# Patient Record
Sex: FEMALE | Race: BLACK OR AFRICAN AMERICAN | NOT HISPANIC OR LATINO | Employment: FULL TIME | URBAN - METROPOLITAN AREA
[De-identification: names, ages, dates, MRNs, and addresses within clinical notes are randomized per-mention and may not be internally consistent; named-entity substitution may affect disease eponyms.]

---

## 2017-01-16 ENCOUNTER — OFFICE VISIT (OUTPATIENT)
Dept: INTERNAL MEDICINE | Facility: CLINIC | Age: 37
End: 2017-01-16
Payer: COMMERCIAL

## 2017-01-16 VITALS
HEIGHT: 69 IN | DIASTOLIC BLOOD PRESSURE: 74 MMHG | BODY MASS INDEX: 28.17 KG/M2 | OXYGEN SATURATION: 98 % | HEART RATE: 70 BPM | TEMPERATURE: 98 F | WEIGHT: 190.2 LBS | SYSTOLIC BLOOD PRESSURE: 118 MMHG

## 2017-01-16 DIAGNOSIS — R79.0 LOW IRON STORES: ICD-10-CM

## 2017-01-16 DIAGNOSIS — E53.8 FOLATE DEFICIENCY: ICD-10-CM

## 2017-01-16 DIAGNOSIS — R53.82 CHRONIC FATIGUE: Primary | ICD-10-CM

## 2017-01-16 DIAGNOSIS — E54 SCURVY: ICD-10-CM

## 2017-01-16 DIAGNOSIS — E55.9 VITAMIN D DEFICIENCY: ICD-10-CM

## 2017-01-16 DIAGNOSIS — E53.1 VITAMIN B6 DEFICIENCY: ICD-10-CM

## 2017-01-16 DIAGNOSIS — G62.9 NEUROPATHY: ICD-10-CM

## 2017-01-16 DIAGNOSIS — E53.8 VITAMIN B12 DEFICIENCY (NON ANEMIC): ICD-10-CM

## 2017-01-16 PROCEDURE — 99214 OFFICE O/P EST MOD 30 MIN: CPT | Performed by: INTERNAL MEDICINE

## 2017-01-16 RX ORDER — PYRIDOXINE HCL (VITAMIN B6) 25 MG
25 TABLET ORAL DAILY
Qty: 30 TABLET | Refills: 8 | Status: SHIPPED | OUTPATIENT
Start: 2017-01-16 | End: 2017-07-17

## 2017-01-16 RX ORDER — PYRIDOXINE HCL (VITAMIN B6) 25 MG
25 TABLET ORAL DAILY
Qty: 30 TABLET | Refills: 8 | Status: SHIPPED | OUTPATIENT
Start: 2017-01-16 | End: 2017-01-16

## 2017-01-16 RX ORDER — FOLIC ACID 1 MG/1
1 TABLET ORAL DAILY
Qty: 100 TABLET | Refills: 3 | Status: SHIPPED | OUTPATIENT
Start: 2017-01-16 | End: 2017-07-17

## 2017-01-16 RX ORDER — FOLIC ACID 1 MG/1
1 TABLET ORAL DAILY
Qty: 100 TABLET | Refills: 3 | Status: SHIPPED | OUTPATIENT
Start: 2017-01-16 | End: 2017-01-16

## 2017-01-16 NOTE — NURSING NOTE
"Chief Complaint   Patient presents with     Results     f/up on lab results        Initial /74 mmHg  Pulse 70  Temp(Src) 98  F (36.7  C) (Oral)  Ht 5' 9\" (1.753 m)  Wt 190 lb 3.2 oz (86.274 kg)  BMI 28.07 kg/m2  SpO2 98%  LMP 12/16/2016 Estimated body mass index is 28.07 kg/(m^2) as calculated from the following:    Height as of this encounter: 5' 9\" (1.753 m).    Weight as of this encounter: 190 lb 3.2 oz (86.274 kg).  BP completed using cuff size: regular    "

## 2017-01-16 NOTE — PATIENT INSTRUCTIONS
** FOLLOW UP PLAN**:    PLEASE SCHEDULE LABS WITH OFFICE VISIT 6 MONTHS FROM TODAY TO FOLLOW UP ON    Chronic fatigue  Neuropathy (H)  Vitamin Deficiencies  Low iron stores      BE SURE TO SCHEDULE YOUR LAB DRAW APPOINTMENT FOR AT LEAST 1 WEEK BEFORE YOUR NEXT VISIT      YOU MAY CONTACT THE CLINIC IF ANY QUESTIONS OR CONCERNS -[phone] OR VIA [employer]

## 2017-01-16 NOTE — MR AVS SNAPSHOT
After Visit Summary   1/16/2017    Kalen Tracy    MRN: [id number]           Patient Information     Date Of Birth          1980        Visit Information        Provider Department      1/16/2017 10:45 AM Michael Rebolledo MD; [employer] [facility]        Today's Diagnoses     Chronic fatigue    -  1     Neuropathy (H)         Vitamin D deficiency         Vitamin B12 deficiency (non anemic)         Folate deficiency         Vitamin B6 deficiency         Low iron stores         Scurvy           Care Instructions    ** FOLLOW UP PLAN**:    PLEASE SCHEDULE LABS WITH OFFICE VISIT 6 MONTHS FROM TODAY TO FOLLOW UP ON    Chronic fatigue  Neuropathy (H)  Vitamin Deficiencies  Low iron stores      BE SURE TO SCHEDULE YOUR LAB DRAW APPOINTMENT FOR AT LEAST 1 WEEK BEFORE YOUR NEXT VISIT      YOU MAY CONTACT THE CLINIC IF ANY QUESTIONS OR CONCERNS -[phone] OR VIA [employer]                   Follow-ups after your visit        Future tests that were ordered for you today     Open Future Orders        Priority Expected Expires Ordered    Vitamin D Deficiency Routine 1/16/2017 7/16/2017 1/16/2017    Ferritin Routine 1/16/2017 7/16/2017 1/16/2017    Vitamin C Routine 1/16/2017 7/16/2017 1/16/2017    Vitamin B12 Routine 1/16/2017 7/16/2017 1/16/2017    Folate Routine 1/16/2017 3/17/2017 1/16/2017    Vitamin B6 Routine 1/16/2017 7/16/2017 1/16/2017            Who to contact     If you have questions or need follow up information about today's clinic visit or your schedule please contact [facility] directly at [phone].  Normal or non-critical lab and imaging results will be communicated to you by MyChart, letter or phone within 4 business days after the clinic has received the results. If you do not hear from us within 7 days, please contact the clinic through [employer] or phone. If you have a critical or abnormal lab result, we will  "notify you by phone as soon as possible.  Submit refill requests through [employer] or call your pharmacy and they will forward the refill request to us. Please allow 3 business days for your refill to be completed.          Additional Information About Your Visit        [employer] Information     SocialGuide lets you send messages to your doctor, view your test results, renew your prescriptions, schedule appointments and more. To sign up, go to [email or web address] . Click on \"Log in\" on the left side of the screen, which will take you to the Welcome page. Then click on \"Sign up Now\" on the right side of the page.     You will be asked to enter the access code listed below, as well as some personal information. Please follow the directions to create your username and password.     Your access code is: [email or web address]  Expires: 2017 12:13 PM     Your access code will  in 90 days. If you need help or a new code, please call your [facility] clinic or [phone].        Care EveryWhere ID     This is your Care EveryWhere ID. This could be used by other organizations to access your [facility] medical records  [id number]        Your Vitals Were     Pulse Temperature Height BMI (Body Mass Index) Pulse Oximetry Last Period    70 98  F (36.7  C) (Oral) 5' 9\" (1.753 m) 28.07 kg/m2 98% 2016       Blood Pressure from Last 3 Encounters:   17 118/74   16 110/62   06/11/15 116/80    Weight from Last 3 Encounters:   17 190 lb 3.2 oz (86.274 kg)   16 190 lb (86.183 kg)   06/11/15 198 lb 8 oz (90.039 kg)                 Today's Medication Changes          These changes are accurate as of: 17 12:13 PM.  If you have any questions, ask your nurse or doctor.               Start taking these medicines.        Dose/Directions    Calcium Carb-Cholecalciferol 1000-800 MG-UNIT Tabs   Commonly known as:  CALCIUM 1000 + D   Used for:  Vitamin D deficiency   Started by:  Michael Rebolledo MD        Dose:  1 " tablet   Take 1 tablet by mouth daily TAKE WITH FOOD, FOR BONE HEALTH AND LOW VITAMIN D ([facility])   Quantity:  100 tablet   Refills:  PRN       cholecalciferol 5000 UNITS Caps   Used for:  Vitamin D deficiency   Started by:  Michael Rebolledo MD        Dose:  1 capsule   Take 1 capsule (5,000 Units) by mouth daily FOR VITAMIN D DEFICIENCY (LOW VITAMIN D),TAKE 2 CAPSULES FOR THE NEXT 2 WEEKS, THEN 1 CAPSULE DAILY, MUST TAKE WITH WITH FAT CONTAINING MEAL   Quantity:  100 capsule   Refills:  3       Cyanocobalamin 5000 MCG/ML Liqd   Commonly known as:  B-12 SUPER STRENGTH   Used for:  Vitamin B12 deficiency (non anemic), Neuropathy (H)   Started by:  Michael Rebolledo MD        Dose:  1 mL   Place 1 mL under the tongue daily FOR VITAMIN B12 SUPPLEMENTATION, PLEASE PLACE UNDER THE TONGUE   Quantity:  2 Bottle   Refills:  PRN       ferrous sulfate Dried 140 (45 FE) MG Tbcr   Used for:  Low iron stores   Started by:  Michael Rebolledo MD        Dose:  1 tablet   Take 1 tablet by mouth daily IRON SUPPLEMENT - PLEASE TAKE WITH EMERGEN-C SUBSTITUTION OF IRON SUPPLEMENT PERMITTED   Quantity:  90 tablet   Refills:  3       folic acid 1 MG tablet   Commonly known as:  FOLVITE   Used for:  Neuropathy (H), Folate deficiency   Started by:  Michael Rebolledo MD        Dose:  1 mg   Take 1 tablet (1 mg) by mouth daily INDICATION: FOLIC ACID SUPPLEMENT   Quantity:  100 tablet   Refills:  3       pyridOXINE 25 MG tablet   Commonly known as:  vitamin B-6   Used for:  Vitamin B6 deficiency   Started by:  Michael Rebolledo MD        Dose:  25 mg   Take 1 tablet (25 mg) by mouth daily INDICATION: VITAMIN B6 DEFICIENCY TO TREAT BREAST PAIN   Quantity:  30 tablet   Refills:  8            Where to get your medicines      These medications were sent to [address] [address]     Phone:  [phone] - calcium  Carb-Cholecalciferol 1000-800 MG-UNIT Tabs  - cholecalciferol 5000 UNITS Caps  - Cyanocobalamin 5000 MCG/ML Liqd  - ferrous sulfate Dried 140 (45 FE) MG Tbcr  - folic acid 1 MG tablet  - pyridOXINE 25 MG tablet      These medications were sent to [id number] - [place], [place] - [address] [facility] [address] [place] [address]  [address] [place], [place] [address]    Hours:  24-hours Phone:  [phone]    - vitamin C-electrolytes 1000mg vitamin C super orange drink mix             Primary Care Provider Office Phone # Fax #    Michael Rebolledo [phone]       [facility] [address]  [facility] [address]        Thank you!     Thank you for choosing [facility]  for your care. Our goal is always to provide you with excellent care. Hearing back from our patients is one way we can continue to improve our services. Please take a few minutes to complete the written survey that you may receive in the mail after your visit with us. Thank you!             Your Updated Medication List - Protect others around you: Learn how to safely use, store and throw away your medicines at www.disposemymeds.org.          This list is accurate as of: 1/16/17 12:13 PM.  Always use your most recent med list.                   Brand Name Dispense Instructions for use    Calcium Carb-Cholecalciferol 1000-800 MG-UNIT Tabs    CALCIUM 1000 + D    100 tablet    Take 1 tablet by mouth daily TAKE WITH FOOD, FOR BONE HEALTH AND LOW VITAMIN D ([facility])       cetirizine 10 MG tablet    zyrTEC    90 tablet    Take 1 tablet (10 mg) by mouth daily TO TREAT NASAL ALLERGIES       cholecalciferol 5000 UNITS Caps     100 capsule    Take 1 capsule (5,000 Units) by mouth daily FOR VITAMIN D DEFICIENCY (LOW VITAMIN D),TAKE 2 CAPSULES FOR THE NEXT 2 WEEKS, THEN 1 CAPSULE DAILY, MUST TAKE WITH WITH FAT CONTAINING MEAL       Cyanocobalamin 5000 MCG/ML Liqd     B-12 SUPER STRENGTH    2 Bottle    Place 1 mL under the tongue daily FOR VITAMIN B12 SUPPLEMENTATION, PLEASE PLACE UNDER THE TONGUE       ferrous sulfate Dried 140 (45 FE) MG Tbcr     90 tablet    Take 1 tablet by mouth daily IRON SUPPLEMENT - PLEASE TAKE WITH EMERGEN-C SUBSTITUTION OF IRON SUPPLEMENT PERMITTED       fluticasone 50 MCG/ACT spray    FLONASE    16 g    Spray 2 sprays in nostril At Bedtime INDICATION: TO CONTROL NASAL ALLERGY SYMPTOMS, DO NOT BLOW NOSE FOR 30 MINUTES AFTER USING       folic acid 1 MG tablet    FOLVITE    100 tablet    Take 1 tablet (1 mg) by mouth daily INDICATION: FOLIC ACID SUPPLEMENT       MICROGESTIN FE 1.5/30 1.5-30 MG-MCG per tablet   Generic drug:  norethindrone-ethinyl estradiol-iron      Take 1 tablet by mouth       pyridOXINE 25 MG tablet    vitamin B-6    30 tablet    Take 1 tablet (25 mg) by mouth daily INDICATION: VITAMIN B6 DEFICIENCY TO TREAT BREAST PAIN       vitamin C-electrolytes 1000mg vitamin C super orange drink mix    EMERGEN-C    90 packet    Mix 1 packet in 4-6oz water and take once daily, INDICATION: VITAMIN C SUPPLEMENTION

## 2017-01-16 NOTE — PROGRESS NOTES
"  SUBJECTIVE:                                                    Kalen Tracy is a 36 year old female who presents to clinic today for the following health issues:  Chief Complaint   Patient presents with     Results     f/up on lab results       She has a history of Vitamin deficiencies causing fatigue and myalgias. She has been somewhat compliant but has stopped taking her all her meds except for B12, calcium and Vit D.    Kalen's most recent test results are as noted and addressed in the plan section of this note, and are significant for findings consistent with:   Vitamin D deficiency  (primary encounter diagnosis) - partially corrected.  Vitamin B12 deficiency (non anemic)  Folate deficiency  Vitamin B6 deficiency  Low iron stores  Scurvy    Which could certainly account for a lot of the symptoms that she has been experiencing.      Other significant issues as outlined and addressed in the plan section of this note.      Problem list and histories reviewed & adjusted, as indicated.  Additional history: as documented    Labs reviewed in EPIC  Problem list, Medication list, Allergies, and Medical/Social/Surgical histories reviewed in Psychiatric and updated as appropriate.    ROS:  14 point ROS negative except as above      OBJECTIVE:                                                    /74 mmHg  Pulse 70  Temp(Src) 98  F (36.7  C) (Oral)  Ht 5' 9\" (1.753 m)  Wt 190 lb 3.2 oz (86.274 kg)  BMI 28.07 kg/m2  SpO2 98%  LMP 12/16/2016  Body mass index is 28.07 kg/(m^2).  GENERAL: healthy, alert and no distress  EYES: Eyes grossly normal to inspection, PERRL and conjunctivae and sclerae normal  HENT: ear canals and TM's normal, nose and mouth without ulcers or lesions  NECK: no adenopathy, no asymmetry, masses, or scars and thyroid normal to palpation  RESP: lungs clear to auscultation - no rales, rhonchi or wheezes  CV: regular rate and rhythm, normal S1 S2, no S3 or S4, no murmur, click or rub, no peripheral " edema and peripheral pulses strong  ABDOMEN: soft, nontender, no hepatosplenomegaly, no masses and bowel sounds normal  MS: no gross musculoskeletal defects noted, no edema    Diagnostic Test Results:  Results for orders placed or performed in visit on 12/19/16   Iron and iron binding capacity   Result Value Ref Range    Iron 110 35 - 180 ug/dL    Iron Binding Cap 371 240 - 430 ug/dL    Iron Saturation Index 30 15 - 46 %   Vitamin B6   Result Value Ref Range    Vitamin B6 60.3    Vitamin C   Result Value Ref Range    Vitamin C 13 (L)    Vitamin B12   Result Value Ref Range    Vitamin B12 477 193 - 986 pg/mL   TSH   Result Value Ref Range    TSH 2.48 0.40 - 4.00 mU/L   T4 free   Result Value Ref Range    T4 Free 0.97 0.76 - 1.46 ng/dL   Folate   Result Value Ref Range    Folate 9.0 >5.4 ng/mL   Ferritin   Result Value Ref Range    Ferritin 57 12 - 150 ng/mL   Vitamin D Deficiency   Result Value Ref Range    Vitamin D Deficiency screening 49 20 - 75 ug/L        ASSESSMENT/PLAN:                                                      DIAGNOSIS/PLAN:   THE PROBLEMS THAT WERE ADDRESSED TODAY ARE AS FOLLOWS:    Encounter Diagnoses   Name Primary?     Neuropathy (H)      Vitamin D deficiency      Vitamin B12 deficiency (non anemic)      Folate deficiency      Vitamin B6 deficiency      Low iron stores      Scurvy      Chronic fatigue Yes       SIGNIFICANT ISSUES  AS NOTED AND ADDRESSED ABOVE     MEDS AND AND LABS AS ORDERED TO ADDRESS PREVIOUS AND CURRENT ABNORMAL INDICES      SEE PATIENT INSTRUCTION SECTION FOR FOLLOW UP PLAN    Kalen IS TO CONTINUE OTHER TREATMENT REGIMEN/PLANS EXCEPT AS INDICATED    COMPLIANCE WITH MEDICATIONS DIET AND EXERCISE PLANS ENCOURAGED    DISCONTINUED MEDS:  Medications Discontinued During This Encounter   Medication Reason     cholecalciferol 5000 UNITS CAPS Reorder     Calcium Carb-Cholecalciferol (CALCIUM 1000 + D) 1000-800 MG-UNIT TABS Reorder     cyabnocobalamin (VITAMIN B-12) 2500 MCG  sublingual tablet Reorder     pyridOXINE (VITAMIN  B-6) 25 MG tablet Reorder     vitamin C-electrolytes (EMERGEN-C) 1000mg vitamin C super orange drink mix Reorder     ferrous sulfate Dried 140 (45 FE) MG TBCR Reorder     Cyanocobalamin (B-12 SUPER STRENGTH) 5000 MCG/ML LIQD Reorder     pyridOXINE (VITAMIN  B-6) 25 MG tablet Reorder     folic acid (FOLVITE) 1 MG tablet Reorder     ferrous sulfate Dried 140 (45 FE) MG TBCR Reorder     cholecalciferol 5000 UNITS CAPS Reorder     Calcium Carb-Cholecalciferol (CALCIUM 1000 + D) 1000-800 MG-UNIT TABS Reorder       CURRENT MED LIST WITH CHANGES AS NOTED BELOW:  Current Outpatient Prescriptions   Medication Sig Dispense Refill     vitamin C-electrolytes (EMERGEN-C) 1000mg vitamin C super orange drink mix Mix 1 packet in 4-6oz water and take once daily, INDICATION: VITAMIN C SUPPLEMENTION 90 packet PRN     Cyanocobalamin (B-12 SUPER STRENGTH) 5000 MCG/ML LIQD Place 1 mL under the tongue daily FOR VITAMIN B12 SUPPLEMENTATION, PLEASE PLACE UNDER THE TONGUE 2 Bottle PRN     pyridOXINE (VITAMIN  B-6) 25 MG tablet Take 1 tablet (25 mg) by mouth daily INDICATION: VITAMIN B6 DEFICIENCY TO TREAT BREAST PAIN 30 tablet 8     folic acid (FOLVITE) 1 MG tablet Take 1 tablet (1 mg) by mouth daily INDICATION: FOLIC ACID SUPPLEMENT 100 tablet 3     ferrous sulfate Dried 140 (45 FE) MG TBCR Take 1 tablet by mouth daily IRON SUPPLEMENT - PLEASE TAKE WITH EMERGEN-C SUBSTITUTION OF IRON SUPPLEMENT PERMITTED 90 tablet 3     cholecalciferol 5000 UNITS CAPS Take 1 capsule (5,000 Units) by mouth daily FOR VITAMIN D DEFICIENCY (LOW VITAMIN D),TAKE 2 CAPSULES FOR THE NEXT 2 WEEKS, THEN 1 CAPSULE DAILY, MUST TAKE WITH WITH FAT CONTAINING MEAL 100 capsule 3     Calcium Carb-Cholecalciferol (CALCIUM 1000 + D) 1000-800 MG-UNIT TABS Take 1 tablet by mouth daily TAKE WITH FOOD, FOR BONE HEALTH AND LOW VITAMIN D ([facility]) 100 tablet PRN     fluticasone (FLONASE) 50 MCG/ACT nasal  spray Spray 2 sprays in nostril At Bedtime INDICATION: TO CONTROL NASAL ALLERGY SYMPTOMS, DO NOT BLOW NOSE FOR 30 MINUTES AFTER USING 16 g PRN     cetirizine (ZYRTEC) 10 MG tablet Take 1 tablet (10 mg) by mouth daily TO TREAT NASAL ALLERGIES 90 tablet 0     MICROGESTIN FE 1.5/30 1.5-30 MG-MCG tablet Take 1 tablet by mouth  3         OFFICE VISIT WAS ACCOMPLISHED WITH THE HELP OF [employer]       Patient Instructions   ** FOLLOW UP PLAN**:    PLEASE SCHEDULE LABS WITH OFFICE VISIT 6 MONTHS FROM TODAY TO FOLLOW UP ON    Chronic fatigue  Neuropathy (H)  Vitamin Deficiencies  Low iron stores      BE SURE TO SCHEDULE YOUR LAB DRAW APPOINTMENT FOR AT LEAST 1 WEEK BEFORE YOUR NEXT VISIT      YOU MAY CONTACT THE CLINIC IF ANY QUESTIONS OR CONCERNS -[phone] OR VIA [employer]                   Michael Rebolledo MD  [facility]

## 2017-07-10 DIAGNOSIS — E53.8 FOLATE DEFICIENCY: ICD-10-CM

## 2017-07-10 DIAGNOSIS — E53.8 VITAMIN B12 DEFICIENCY (NON ANEMIC): ICD-10-CM

## 2017-07-10 DIAGNOSIS — E55.9 VITAMIN D DEFICIENCY: ICD-10-CM

## 2017-07-10 DIAGNOSIS — E53.1 VITAMIN B6 DEFICIENCY: ICD-10-CM

## 2017-07-10 DIAGNOSIS — R79.0 LOW IRON STORES: ICD-10-CM

## 2017-07-10 DIAGNOSIS — E54 SCURVY: ICD-10-CM

## 2017-07-10 LAB
FOLATE SERPL-MCNC: 4.9 NG/ML
VIT B12 SERPL-MCNC: 478 PG/ML (ref 193–986)

## 2017-07-10 PROCEDURE — 99000 SPECIMEN HANDLING OFFICE-LAB: CPT | Performed by: INTERNAL MEDICINE

## 2017-07-10 PROCEDURE — 82607 VITAMIN B-12: CPT | Performed by: INTERNAL MEDICINE

## 2017-07-10 PROCEDURE — 82728 ASSAY OF FERRITIN: CPT | Performed by: INTERNAL MEDICINE

## 2017-07-10 PROCEDURE — 36415 COLL VENOUS BLD VENIPUNCTURE: CPT | Performed by: INTERNAL MEDICINE

## 2017-07-10 PROCEDURE — 84207 ASSAY OF VITAMIN B-6: CPT | Mod: 90 | Performed by: INTERNAL MEDICINE

## 2017-07-10 PROCEDURE — 82746 ASSAY OF FOLIC ACID SERUM: CPT | Performed by: INTERNAL MEDICINE

## 2017-07-10 PROCEDURE — 82306 VITAMIN D 25 HYDROXY: CPT | Performed by: INTERNAL MEDICINE

## 2017-07-10 PROCEDURE — 82180 ASSAY OF ASCORBIC ACID: CPT | Mod: 90 | Performed by: INTERNAL MEDICINE

## 2017-07-11 LAB
DEPRECATED CALCIDIOL+CALCIFEROL SERPL-MC: 56 UG/L (ref 20–75)
FERRITIN SERPL-MCNC: 65 NG/ML (ref 12–150)
VIT B6 SERPL-MCNC: 18.5 NG/ML

## 2017-07-14 LAB — VIT C SERPL-MCNC: 9 MG/DL

## 2017-07-17 ENCOUNTER — RESULTS ONLY (OUTPATIENT)
Dept: OTHER | Facility: CLINIC | Age: 37
End: 2017-07-17

## 2017-07-17 ENCOUNTER — OFFICE VISIT (OUTPATIENT)
Dept: INTERNAL MEDICINE | Facility: CLINIC | Age: 37
End: 2017-07-17
Payer: COMMERCIAL

## 2017-07-17 VITALS
RESPIRATION RATE: 18 BRPM | SYSTOLIC BLOOD PRESSURE: 100 MMHG | TEMPERATURE: 97.8 F | BODY MASS INDEX: 28.06 KG/M2 | HEART RATE: 72 BPM | OXYGEN SATURATION: 100 % | DIASTOLIC BLOOD PRESSURE: 70 MMHG | WEIGHT: 190 LBS

## 2017-07-17 DIAGNOSIS — R79.0 LOW IRON STORES: ICD-10-CM

## 2017-07-17 DIAGNOSIS — E53.1 VITAMIN B6 DEFICIENCY: ICD-10-CM

## 2017-07-17 DIAGNOSIS — E54 SCURVY: ICD-10-CM

## 2017-07-17 DIAGNOSIS — R53.82 CHRONIC FATIGUE: ICD-10-CM

## 2017-07-17 DIAGNOSIS — E55.9 VITAMIN D DEFICIENCY: ICD-10-CM

## 2017-07-17 DIAGNOSIS — D53.1 COMBINED B12 AND FOLATE DEFICIENCY ANEMIA: Primary | ICD-10-CM

## 2017-07-17 DIAGNOSIS — G62.9 NEUROPATHY: ICD-10-CM

## 2017-07-17 PROCEDURE — 83516 IMMUNOASSAY NONANTIBODY: CPT | Mod: 90 | Performed by: INTERNAL MEDICINE

## 2017-07-17 PROCEDURE — 81375 HLA II TYPING AG EQUIV LR: CPT | Performed by: INTERNAL MEDICINE

## 2017-07-17 PROCEDURE — 86256 FLUORESCENT ANTIBODY TITER: CPT | Mod: 90 | Performed by: INTERNAL MEDICINE

## 2017-07-17 PROCEDURE — 82784 ASSAY IGA/IGD/IGG/IGM EACH: CPT | Performed by: INTERNAL MEDICINE

## 2017-07-17 PROCEDURE — 99000 SPECIMEN HANDLING OFFICE-LAB: CPT | Performed by: INTERNAL MEDICINE

## 2017-07-17 PROCEDURE — 83516 IMMUNOASSAY NONANTIBODY: CPT | Mod: 91 | Performed by: INTERNAL MEDICINE

## 2017-07-17 PROCEDURE — 99215 OFFICE O/P EST HI 40 MIN: CPT | Performed by: INTERNAL MEDICINE

## 2017-07-17 PROCEDURE — 36415 COLL VENOUS BLD VENIPUNCTURE: CPT | Performed by: INTERNAL MEDICINE

## 2017-07-17 PROCEDURE — 81376 HLA II TYPING 1 LOCUS LR: CPT | Mod: 59 | Performed by: INTERNAL MEDICINE

## 2017-07-17 RX ORDER — MV-MIN/VIT C/GLUT/LYSINE/HB124 1000-50 MG
1 TABLET, EFFERVESCENT ORAL EVERY MORNING
Qty: 30 TABLET | Refills: 11 | Status: SHIPPED | OUTPATIENT
Start: 2017-07-17 | End: 2020-04-22

## 2017-07-17 RX ORDER — PYRIDOXINE HCL (VITAMIN B6) 50 MG
50 TABLET ORAL DAILY
Qty: 90 TABLET | Refills: 3 | Status: SHIPPED | OUTPATIENT
Start: 2017-07-17 | End: 2019-04-26

## 2017-07-17 RX ORDER — CYANOCOBALAMIN 1000 UG/ML
1 INJECTION, SOLUTION INTRAMUSCULAR; SUBCUTANEOUS
Qty: 30 ML | Refills: 5 | OUTPATIENT
Start: 2017-07-17 | End: 2019-04-26

## 2017-07-17 RX ORDER — CYANOCOBALAMIN 1000 UG/ML
2 INJECTION, SOLUTION INTRAMUSCULAR; SUBCUTANEOUS ONCE
Qty: 2 ML | Refills: 0 | OUTPATIENT
Start: 2017-07-17 | End: 2017-07-17

## 2017-07-17 NOTE — NURSING NOTE
"Chief Complaint   Patient presents with     Fatigue     Results       Initial /70  Pulse 72  Temp 97.8  F (36.6  C) (Oral)  Resp 18  Wt 190 lb (86.2 kg)  LMP 07/10/2017  SpO2 100%  BMI 28.06 kg/m2 Estimated body mass index is 28.06 kg/(m^2) as calculated from the following:    Height as of 1/16/17: 5' 9\" (1.753 m).    Weight as of this encounter: 190 lb (86.2 kg).  Blood pressure completed using cuff size: regular    "

## 2017-07-17 NOTE — PATIENT INSTRUCTIONS
** FOLLOW UP PLAN**:    PLEASE SCHEDULE LABS WITH OFFICE VISIT 3 MONTHS FROM TODAY TO FOLLOW UP ON  VITAMIN DEFICIENCIES AND SYMPTOMS OF FATIGUE     BE SURE TO SCHEDULE YOUR LAB DRAW APPOINTMENT FOR AT LEAST 5 DAYS BEFORE YOUR NEXT VISIT    PLEASE SCHEDULE NURSE ONLY APPOINTMENTS FOR B-12 SHOTS AT CLINIC CLOSEST TO PATIENT'S HOME PER THE SCHEDULE BELOW:  DAILY FOR 7 DAYS, THEN EVERY WEEK FOR A MONTH, THEN EVERY TWO WEEKS     WE WILL DISCUSS YOUR TEST RESULTS  FROM TODAY VIA MY CHART    YOU MAY CONTACT THE CLINIC IF ANY QUESTIONS OR CONCERNS -[phone] OR VIA [employer]

## 2017-07-17 NOTE — PROGRESS NOTES
SUBJECTIVE:                                                    Kalen Tracy is a 36 year old female who presents to clinic today for the following health issues:      Fatigue      Duration: many years    Description (location/character/radiation): general fatigue    Intensity:  mild    Accompanying signs and symptoms: none    History (similar episodes/previous evaluation): n/a    Precipitating or alleviating factors: vitamin deficiencies    Therapies tried and outcome: Medications as ordered. Kalne states that she has been taking her medications regularly except for vitamin C which gives her reflux, but her labs still show that she is deficient in vitamin B-12, B 6 and she also has a moderate to  Severe folate deficiency despite indicating that she takes her medications. Which would suggest that there may be an underlying malabsorptive process such as celiac disease for which she has not been screened in the past.     Kalen's most recent test results are as noted and addressed in the plan section of this note, and are significant for findings consistent with:   Combined B12 and folate deficiency  Vitamin B6 deficiency  Scurvy      Which could certainly account for a lot of the symptoms that she has been experiencing. She however states that she had noted improvement in symptoms of fatigue, andmuscle aches and pains.    Other significant issues as outlined and addressed in the plan section of this note.      Problem list and histories reviewed & adjusted, as indicated.  Additional history: as documented    Labs reviewed in EPIC and as noted above    Reviewed and updated as needed this visit by clinical staff  Tobacco  Allergies  Meds  Med Hx  Surg Hx  Fam Hx  Soc Hx      Reviewed and updated as needed this visit by Provider         ROS:  14 point ROS negative except as above    OBJECTIVE:     /70  Pulse 72  Temp 97.8  F (36.6  C) (Oral)  Resp 18  Wt 190 lb (86.2 kg)  LMP 07/10/2017  SpO2  100%  BMI 28.06 kg/m2  Body mass index is 28.06 kg/(m^2).  GENERAL: healthy, alert and no distress  NECK: no adenopathy, no asymmetry, masses, or scars and thyroid normal to palpation  RESP: lungs clear to auscultation - no rales, rhonchi or wheezes  CV: regular rate and rhythm, normal S1 S2, no S3 or S4, no murmur, click or rub, no peripheral edema and peripheral pulses strong  ABDOMEN: soft, nontender, no hepatosplenomegaly, no masses and bowel sounds normal  MS: no gross musculoskeletal defects noted, no edema  SKIN: no suspicious lesions or rashes  PSYCH: mentation appears normal, affect normal/bright    Diagnostic Test Results:  Results for orders placed or performed in visit on 07/10/17   Vitamin D Deficiency   Result Value Ref Range    Vitamin D Deficiency screening 56 20 - 75 ug/L   Ferritin   Result Value Ref Range    Ferritin 65 12 - 150 ng/mL   Vitamin C   Result Value Ref Range    Vitamin C 9 (L)    Vitamin B12   Result Value Ref Range    Vitamin B12 478 193 - 986 pg/mL   Vitamin B6   Result Value Ref Range    Vitamin B6 18.5 (L)    Folate   Result Value Ref Range    Folate 4.9 (L) >5.4 ng/mL       ASSESSMENT/PLAN:     DIAGNOSIS/PLAN:   THE PROBLEMS THAT WERE ADDRESSED TODAY ARE AS FOLLOWS:    Encounter Diagnoses   Name Primary?     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     Chronic fatigue      Scurvy      Low iron stores      Vitamin D deficiency        SIGNIFICANT ISSUES  AS NOTED AND ADDRESSED ABOVE     MEDS AND AND LABS AS ORDERED TO ADDRESS PREVIOUS AND CURRENT ABNORMAL INDICES    PLEASE SEE HISTORY ABOVE FOR FULL DETAILS OF THE PLANNED WORKUP AS I BELIEVE THAT PATIENT MAY BE DEALING WITH A MALABSORPTIVE PROCESS    SHE WILL HAVE LABS DRAWN TODAY TO EXCLUDE CELIAC DISEASE AND SHE WILL FOLLOW-UP WITH ME VIA IZI-collecteT      SEE PATIENT INSTRUCTION SECTION FOR FOLLOW UP PLAN    Kalen IS TO CONTINUE OTHER TREATMENT REGIMEN/PLANS EXCEPT AS  INDICATED    COMPLIANCE WITH MEDICATIONS DIET AND EXERCISE PLANS ENCOURAGED    DISCONTINUED MEDS:  Medications Discontinued During This Encounter   Medication Reason     Cyanocobalamin (B-12 SUPER STRENGTH) 5000 MCG/ML LIQD Reorder     pyridOXINE (VITAMIN  B-6) 25 MG tablet Reorder     ferrous sulfate Dried 140 (45 FE) MG TBCR Reorder     cholecalciferol 5000 UNITS CAPS Reorder       CURRENT MED LIST WITH CHANGES AS NOTED BELOW:  Current Outpatient Prescriptions   Medication Sig Dispense Refill     cyanocobalamin (VITAMIN B12) 1000 MCG/ML injection Inject 1 mL (1,000 mcg) into the muscle every 30 days DAILY FOR 7 DAYS, THEN EVERY WEEK FOR A MONTH, THEN EVERY TWO WEEKS TILL SEEN INDICATION: FOR VITAMIN B12 SUPPLEMENTATION 30 mL 5     pyridOXINE (VITAMIN B-6) 50 MG tablet Take 1 tablet (50 mg) by mouth daily 90 tablet 3     folic acid 5 MG CAPS Take 5 mg by mouth daily May dispense tablets 90 capsule 3     ferrous fumarate 65 mg, [place]. FE,-Vitamin C 125 mg (VITRON C)  MG TABS tablet Take 1 tablet by mouth every morning (before breakfast) INDICATION: IRON SUPPLEMENT 100 tablet prn     cholecalciferol 5000 UNITS CAPS Take 1 capsule (5,000 Units) by mouth daily FOR VITAMIN D DEFICIENCY (LOW VITAMIN D) 100 capsule 3     cyanocobalamin (VITAMIN B12) 1000 MCG/ML injection Inject 2 mLs (2,000 mcg) into the muscle once for 1 dose 2 mL 0     folic acid (FOLVITE) 1 MG tablet Take 1 tablet (1 mg) by mouth daily INDICATION: FOLIC ACID SUPPLEMENT 100 tablet 3     Calcium Carb-Cholecalciferol (CALCIUM 1000 + D) 1000-800 MG-UNIT TABS Take 1 tablet by mouth daily TAKE WITH FOOD, FOR BONE HEALTH AND LOW VITAMIN D ([facility]) 100 tablet PRN     fluticasone (FLONASE) 50 MCG/ACT nasal spray Spray 2 sprays in nostril At Bedtime INDICATION: TO CONTROL NASAL ALLERGY SYMPTOMS, DO NOT BLOW NOSE FOR 30 MINUTES AFTER USING 16 g PRN     cetirizine (ZYRTEC) 10 MG tablet Take 1 tablet (10 mg) by mouth daily TO TREAT  NASAL ALLERGIES 90 tablet 0     MICROGESTIN FE 1.5/30 1.5-30 MG-MCG tablet Take 1 tablet by mouth  3     vitamin C-electrolytes (EMERGEN-C) 1000mg vitamin C super orange drink mix Mix 1 packet in 4-6oz water and take once daily, INDICATION: VITAMIN C SUPPLEMENTION (Patient not taking: Reported on 7/17/2017) 90 packet PRN         OFFICE VISIT WAS ACCOMPLISHED WITH THE HELP OF [place]       Office visit time > 40 mins, greater than 50% of which was spent obtaining history, reviewing medications, counseling re compliance with treatment plan, discussion of treatment, follow up plans, and coordination of care.     Patient Instructions   ** FOLLOW UP PLAN**:    PLEASE SCHEDULE LABS WITH OFFICE VISIT 3 MONTHS FROM TODAY TO FOLLOW UP ON  VITAMIN DEFICIENCIES AND SYMPTOMS OF FATIGUE     BE SURE TO SCHEDULE YOUR LAB DRAW APPOINTMENT FOR AT LEAST 5 DAYS BEFORE YOUR NEXT VISIT    PLEASE SCHEDULE NURSE ONLY APPOINTMENTS FOR B-12 SHOTS AT CLINIC CLOSEST TO PATIENT'S HOME PER THE SCHEDULE BELOW:  DAILY FOR 7 DAYS, THEN EVERY WEEK FOR A MONTH, THEN EVERY TWO WEEKS     WE WILL DISCUSS YOUR TEST RESULTS  FROM TODAY VIA MY CHART    YOU MAY CONTACT THE CLINIC IF ANY QUESTIONS OR CONCERNS -[phone] OR VIA MYCGIORGI Rebolledo MD  [facility]

## 2017-07-17 NOTE — MR AVS SNAPSHOT
After Visit Summary   7/17/2017    Kalen Tracy    MRN: [id number]           Patient Information     Date Of Birth          1980        Visit Information        Provider Department      7/17/2017 10:15 AM Michael Rebolledo MD; [employer] [facility]        Today's Diagnoses     Combined B12 and folate deficiency anemia    -  1    Neuropathy (H)        Vitamin B6 deficiency        Chronic fatigue        Scurvy        Low iron stores        Vitamin D deficiency          Care Instructions    ** FOLLOW UP PLAN**:    PLEASE SCHEDULE LABS WITH OFFICE VISIT 3 MONTHS FROM TODAY TO FOLLOW UP ON  VITAMIN DEFICIENCIES AND SYMPTOMS OF FATIGUE     BE SURE TO SCHEDULE YOUR LAB DRAW APPOINTMENT FOR AT LEAST 5 DAYS BEFORE YOUR NEXT VISIT    PLEASE SCHEDULE NURSE ONLY APPOINTMENTS FOR B-12 SHOTS AT CLINIC CLOSEST TO PATIENT'S HOME PER THE SCHEDULE BELOW:  DAILY FOR 7 DAYS, THEN EVERY WEEK FOR A MONTH, THEN EVERY TWO WEEKS     WE WILL DISCUSS YOUR TEST RESULTS  FROM TODAY VIA MY CHART    YOU MAY CONTACT THE CLINIC IF ANY QUESTIONS OR CONCERNS -[phone] OR VIA MyRealTripT                     Follow-ups after your visit        Future tests that were ordered for you today     Open Future Orders        Priority Expected Expires Ordered    Vitamin B6 Routine 7/17/2017 1/14/2018 7/17/2017    Vitamin C Routine 7/17/2017 1/14/2018 7/17/2017    Vitamin B12 Routine 7/17/2017 1/14/2018 7/17/2017    Folate Routine 7/17/2017 9/15/2017 7/17/2017            Who to contact     If you have questions or need follow up information about today's clinic visit or your schedule please contact [facility] directly at [phone].  Normal or non-critical lab and imaging results will be communicated to you by MyChart, letter or phone within 4 business days after the clinic has received the results. If you do not hear from us within 7 days, please contact the clinic  through [employer] or phone. If you have a critical or abnormal lab result, we will notify you by phone as soon as possible.  Submit refill requests through [employer] or call your pharmacy and they will forward the refill request to us. Please allow 3 business days for your refill to be completed.          Additional Information About Your Visit        [employer] Information     [employer] gives you secure access to your electronic health record. If you see a primary care provider, you can also send messages to your care team and make appointments. If you have questions, please call your primary care clinic.  If you do not have a primary care provider, please call [phone] and they will assist you.        Care EveryWhere ID     This is your Care EveryWhere ID. This could be used by other organizations to access your [place] medical records  [id number]        Your Vitals Were     Pulse Temperature Respirations Last Period Pulse Oximetry BMI (Body Mass Index)    72 97.8  F (36.6  C) (Oral) 18 07/10/2017 100% 28.06 kg/m2       Blood Pressure from Last 3 Encounters:   07/17/17 100/70   01/16/17 118/74   09/26/16 110/62    Weight from Last 3 Encounters:   07/17/17 190 lb (86.2 kg)   01/16/17 190 lb 3.2 oz (86.3 kg)   09/26/16 190 lb (86.2 kg)              We Performed the Following     Celiac Disease Dual Antigen Screen [BLX2014]     Deamidated Giladin Peptide Bryanna IgA IgG [OFU0150]     Endomysial Antibody IgA by IFA [LDD3697]     HLA DQB1 for Celiac Disease [EYN8502]     IgA [LAB73]     Tissue transglutaminase bryanna IgA and IgG [CFZ9539]          Today's Medication Changes          These changes are accurate as of: 7/17/17 11:22 AM.  If you have any questions, ask your nurse or doctor.               Start taking these medicines.        Dose/Directions    * cyanocobalamin 1000 MCG/ML injection   Commonly known as:  VITAMIN B12   Used for:  Neuropathy (H)   Replaces:  Cyanocobalamin 5000 MCG/ML Liqd   Started by:  Amaury  Michael WAN MD        Dose:  1 mL   Inject 1 mL (1,000 mcg) into the muscle every 30 days DAILY FOR 7 DAYS, THEN EVERY WEEK FOR A MONTH, THEN EVERY TWO WEEKS TILL SEEN INDICATION: FOR VITAMIN B12 SUPPLEMENTATION   Quantity:  30 mL   Refills:  5       * cyanocobalamin 1000 MCG/ML injection   Commonly known as:  VITAMIN B12   Used for:  Neuropathy (H), Vitamin B6 deficiency, Combined B12 and folate deficiency anemia, Chronic fatigue, Scurvy, Low iron stores, Vitamin D deficiency   Started by:  Michael Rebolledo MD        Dose:  2 mL   Inject 2 mLs (2,000 mcg) into the muscle once for 1 dose   Quantity:  2 mL   Refills:  0       ferrous fumarate 65 mg ([place]. FE)-Vitamin C 125 mg  MG Tabs tablet   Commonly known as:  VITRON C   Used for:  Low iron stores   Replaces:  ferrous sulfate Dried 140 (45 FE) MG Tbcr   Started by:  Michael Rebolledo MD        Dose:  1 tablet   Take 1 tablet by mouth every morning (before breakfast) INDICATION: IRON SUPPLEMENT   Quantity:  100 tablet   Refills:  prn       * Notice:  This list has 2 medication(s) that are the same as other medications prescribed for you. Read the directions carefully, and ask your doctor or other care provider to review them with you.      These medicines have changed or have updated prescriptions.        Dose/Directions    cholecalciferol 5000 UNITS Caps   This may have changed:  additional instructions   Used for:  Vitamin D deficiency   Changed by:  Michael Rebolledo MD        Dose:  1 capsule   Take 1 capsule (5,000 Units) by mouth daily FOR VITAMIN D DEFICIENCY (LOW VITAMIN D)   Quantity:  100 capsule   Refills:  3       * folic acid 1 MG tablet   Commonly known as:  FOLVITE   This may have changed:  Another medication with the same name was added. Make sure you understand how and when to take each.   Used for:  Neuropathy (H), Folate deficiency   Changed by:  Michael Rebolledo MD        Dose:  1 mg   Take 1 tablet (1 mg) by mouth daily  INDICATION: FOLIC ACID SUPPLEMENT   Quantity:  100 tablet   Refills:  3       * folic acid 5 MG Caps   This may have changed:  You were already taking a medication with the same name, and this prescription was added. Make sure you understand how and when to take each.   Used for:  Combined B12 and folate deficiency anemia   Changed by:  Michael Rebolledo MD        Dose:  5 mg   Take 5 mg by mouth daily May dispense tablets   Quantity:  90 capsule   Refills:  3       pyridOXINE 50 MG tablet   Commonly known as:  VITAMIN B-6   This may have changed:    - medication strength  - how much to take  - additional instructions   Used for:  Vitamin B6 deficiency   Changed by:  Michael Rebolledo MD        Dose:  50 mg   Take 1 tablet (50 mg) by mouth daily   Quantity:  90 tablet   Refills:  3       * Notice:  This list has 2 medication(s) that are the same as other medications prescribed for you. Read the directions carefully, and ask your doctor or other care provider to review them with you.      Stop taking these medicines if you haven't already. Please contact your care team if you have questions.     Cyanocobalamin 5000 MCG/ML Liqd   Commonly known as:  B-12 SUPER STRENGTH   Replaced by:  cyanocobalamin 1000 MCG/ML injection   Stopped by:  Michael Rebolledo MD           ferrous sulfate Dried 140 (45 FE) MG Tbcr   Replaced by:  ferrous fumarate 65 mg ([place]. FE)-Vitamin C 125 mg  MG Tabs tablet   Stopped by:  Michael Rebolledo MD                Where to get your medicines      [id number] - [place], [place] - [address] AT [facility] [place] [address]  [address], [place] [address]    Hours:  24-hours Phone:  [phone]     cholecalciferol 5000 UNITS Caps    ferrous fumarate 65 mg ([place]. FE)-Vitamin C 125 mg  MG Tabs tablet    folic acid 5 MG Caps    pyridOXINE 50 MG tablet         Some of these will need a paper prescription and others  can be bought over the counter.  Ask your nurse if you have questions.     You don't need a prescription for these medications     cyanocobalamin 1000 MCG/ML injection    cyanocobalamin 1000 MCG/ML injection                Primary Care Provider Office Phone # Fax #    Michael Rebolledo [phone]       [facility] [address] [facility] [address]        Equal Access to Services     ARASELI WALLACE : Hadii aad ku hadasho Soomaali, waaxda luqadaha, qaybta kaalmada adeegyada, waxay anupamain hayaan amilcar lugotemidemetrius stringer . So [facility] [phone].    ATENCIÓN: Si alla espbrooke, tiene a louis disposición servicios gratuitos de asistencia lingüística. Braulio al [phone].    We comply with applicable federal civil rights laws and [place] laws. We do not discriminate on the basis of race, color, national origin, age, disability sex, sexual orientation or gender identity.            Thank you!     Thank you for choosing [facility]  for your care. Our goal is always to provide you with excellent care. Hearing back from our patients is one way we can continue to improve our services. Please take a few minutes to complete the written survey that you may receive in the mail after your visit with us. Thank you!             Your Updated Medication List - Protect others around you: Learn how to safely use, store and throw away your medicines at www.disposemymeds.org.          This list is accurate as of: 7/17/17 11:22 AM.  Always use your most recent med list.                   Brand Name Dispense Instructions for use Diagnosis    Calcium Carb-Cholecalciferol 1000-800 MG-UNIT Tabs    CALCIUM 1000 + D    100 tablet    Take 1 tablet by mouth daily TAKE WITH FOOD, FOR BONE HEALTH AND LOW VITAMIN D ([facility])    Vitamin D deficiency       cetirizine 10 MG tablet    zyrTEC    90 tablet    Take 1 tablet (10 mg) by mouth daily TO TREAT NASAL ALLERGIES    Allergic  rhinitis       cholecalciferol 5000 UNITS Caps     100 capsule    Take 1 capsule (5,000 Units) by mouth daily FOR VITAMIN D DEFICIENCY (LOW VITAMIN D)    Vitamin D deficiency       * cyanocobalamin 1000 MCG/ML injection    VITAMIN B12    30 mL    Inject 1 mL (1,000 mcg) into the muscle every 30 days DAILY FOR 7 DAYS, THEN EVERY WEEK FOR A MONTH, THEN EVERY TWO WEEKS TILL SEEN INDICATION: FOR VITAMIN B12 SUPPLEMENTATION    Neuropathy (H)       * cyanocobalamin 1000 MCG/ML injection    VITAMIN B12    2 mL    Inject 2 mLs (2,000 mcg) into the muscle once for 1 dose    Neuropathy (H), Vitamin B6 deficiency, Combined B12 and folate deficiency anemia, Chronic fatigue, Scurvy, Low iron stores, Vitamin D deficiency       ferrous fumarate 65 mg ([place]. FE)-Vitamin C 125 mg  MG Tabs tablet    VITRON C    100 tablet    Take 1 tablet by mouth every morning (before breakfast) INDICATION: IRON SUPPLEMENT    Low iron stores       fluticasone 50 MCG/ACT spray    FLONASE    16 g    Spray 2 sprays in nostril At Bedtime INDICATION: TO CONTROL NASAL ALLERGY SYMPTOMS, DO NOT BLOW NOSE FOR 30 MINUTES AFTER USING    Other seasonal allergic rhinitis       * folic acid 1 MG tablet    FOLVITE    100 tablet    Take 1 tablet (1 mg) by mouth daily INDICATION: FOLIC ACID SUPPLEMENT    Neuropathy (H), Folate deficiency       * folic acid 5 MG Caps     90 capsule    Take 5 mg by mouth daily May dispense tablets    Combined B12 and folate deficiency anemia       MICROGESTIN FE 1.5/30 1.5-30 MG-MCG per tablet   Generic drug:  norethindrone-ethinyl estradiol-iron      Take 1 tablet by mouth        pyridOXINE 50 MG tablet    VITAMIN B-6    90 tablet    Take 1 tablet (50 mg) by mouth daily    Vitamin B6 deficiency       vitamin C-electrolytes 1000mg vitamin C super orange drink mix    EMERGEN-C    90 packet    Mix 1 packet in 4-6oz water and take once daily, INDICATION: VITAMIN C SUPPLEMENTION    Vitamin B6 deficiency       * Notice:  This list  has 4 medication(s) that are the same as other medications prescribed for you. Read the directions carefully, and ask your doctor or other care provider to review them with you.

## 2017-07-18 ENCOUNTER — ALLIED HEALTH/NURSE VISIT (OUTPATIENT)
Dept: NURSING | Facility: CLINIC | Age: 37
End: 2017-07-18
Payer: COMMERCIAL

## 2017-07-18 DIAGNOSIS — E53.8 VITAMIN B12 DEFICIENCY WITHOUT ANEMIA: Primary | ICD-10-CM

## 2017-07-18 LAB
HLA DQB1 FOR CELIAC DISEASE: NORMAL
IGA SERPL-MCNC: 223 MG/DL (ref 70–380)

## 2017-07-18 PROCEDURE — 96372 THER/PROPH/DIAG INJ SC/IM: CPT

## 2017-07-19 ENCOUNTER — ALLIED HEALTH/NURSE VISIT (OUTPATIENT)
Dept: NURSING | Facility: CLINIC | Age: 37
End: 2017-07-19
Payer: COMMERCIAL

## 2017-07-19 DIAGNOSIS — E53.8 VITAMIN B12 DEFICIENCY WITHOUT ANEMIA: Primary | ICD-10-CM

## 2017-07-19 LAB
GLIADIN IGA SER-ACNC: NORMAL U/ML
GLIADIN IGG SER-ACNC: NORMAL U/ML
TTG IGA SER-ACNC: NORMAL U/ML
TTG IGG SER-ACNC: NORMAL U/ML

## 2017-07-19 PROCEDURE — 96372 THER/PROPH/DIAG INJ SC/IM: CPT

## 2017-07-19 NOTE — PROGRESS NOTES
Prior to injection verified patient identity using patient's name and date of birth.  eHnrietta Bradley CMA

## 2017-07-19 NOTE — MR AVS SNAPSHOT
After Visit Summary   7/19/2017    Kalen Tracy    MRN: [id number]           Patient Information     Date Of Birth          1980        Visit Information        Provider Department      7/19/2017 9:00 AM [employer]; [place]/LP[place] [facility]        Today's Diagnoses     Vitamin B12 deficiency without anemia    -  1       Follow-ups after your visit        Your next 10 appointments already scheduled     Jul 20, 2017 11:00 AM CDT   Nurse Only with EC MA/LPN   [facility] [place] [place] [facility]    [address] [place] [address]   [phone]            Jul 21, 2017  8:30 AM CDT   Nurse Only with EC MA/LPN   [facility] [place] [place]    [address] [place] [address]   [phone]            Oct 16, 2017 11:30 AM CDT   Office Visit with Michael Rebolledo MD   [facility] ([facility])    [address] [id number] [phone]           Bring a current list of meds and any records pertaining to this visit.  For Physicals, please bring immunization records and any forms needing to be filled out.  Please arrive 10 minutes early to complete paperwork.              Who to contact     If you have questions or need follow up information about today's clinic visit or your schedule please contact [facility] directly at [phone].  Normal or non-critical lab and imaging results will be communicated to you by MyChart, letter or phone within 4 business days after the clinic has received the results. If you do not hear from us within 7 days, please contact the clinic through MyChart or phone. If you have a critical or abnormal lab result, we will notify you by phone as soon as possible.  Submit refill requests through [employer] or call your pharmacy and they will forward the  refill request to us. Please allow 3 business days for your refill to be completed.          Additional Information About Your Visit        MyChart Information     [employer] gives you secure access to your electronic health record. If you see a primary care provider, you can also send messages to your care team and make appointments. If you have questions, please call your primary care clinic.  If you do not have a primary care provider, please call [phone] and they will assist you.        Care EveryWhere ID     This is your Care EveryWhere ID. This could be used by other organizations to access your [place] medical records  [id number]        Your Vitals Were     Last Period                   07/10/2017            Blood Pressure from Last 3 Encounters:   07/17/17 100/70   01/16/17 118/74   09/26/16 110/62    Weight from Last 3 Encounters:   07/17/17 190 lb (86.2 kg)   01/16/17 190 lb 3.2 oz (86.3 kg)   09/26/16 190 lb (86.2 kg)              We Performed the Following     INJECTION INTRAMUSCULAR OR SUB-Q     VITAMIN B12 INJ /1000MCG        Primary Care Provider Office Phone # Fax #    Michael Rebolledo [phone]       [facility] [address] [facility] [address]        Equal Access to Services     ARASELI WALLACE AH: Hadii aad ku hadasho Soomaali, waaxda luqadaha, qaybta kaalmada adeegyada, waxay anupamain zackary fuller. So [facility] [phone].    ATENCIÓN: Si habla español, tiene a louis disposición servicios gratuitos de asistencia lingüística. Llame al [phone].    We comply with applicable federal civil rights laws and [place] laws. We do not discriminate on the basis of race, color, national origin, age, disability sex, sexual orientation or gender identity.            Thank you!     Thank you for choosing [facility]  for your care. Our goal is always to provide you with excellent care. Hearing back from our patients is one way we can continue to  improve our services. Please take a few minutes to complete the written survey that you may receive in the mail after your visit with us. Thank you!             Your Updated Medication List - Protect others around you: Learn how to safely use, store and throw away your medicines at www.disposemymeds.org.          This list is accurate as of: 7/19/17  9:15 AM.  Always use your most recent med list.                   Brand Name Dispense Instructions for use Diagnosis    AIRBORNE Tbef     30 tablet    Take 1 tablet by mouth every morning INDICATION: VITAMIN C SUPPLEMENT    Vitamin B6 deficiency       Calcium Carb-Cholecalciferol 1000-800 MG-UNIT Tabs    CALCIUM 1000 + D    100 tablet    Take 1 tablet by mouth daily TAKE WITH FOOD, FOR BONE HEALTH AND LOW VITAMIN D ([facility])    Vitamin D deficiency       cetirizine 10 MG tablet    zyrTEC    90 tablet    Take 1 tablet (10 mg) by mouth daily TO TREAT NASAL ALLERGIES    Allergic rhinitis       cholecalciferol 5000 UNITS Caps     100 capsule    Take 1 capsule (5,000 Units) by mouth daily FOR VITAMIN D DEFICIENCY (LOW VITAMIN D)    Vitamin D deficiency       cyanocobalamin 1000 MCG/ML injection    VITAMIN B12    30 mL    Inject 1 mL (1,000 mcg) into the muscle every 30 days DAILY FOR 7 DAYS, THEN EVERY WEEK FOR A MONTH, THEN EVERY TWO WEEKS TILL SEEN INDICATION: FOR VITAMIN B12 SUPPLEMENTATION    Neuropathy (H), Combined B12 and folate deficiency anemia       ferrous fumarate 65 mg ([place]. FE)-Vitamin C 125 mg  MG Tabs tablet    VITRON C    100 tablet    Take 1 tablet by mouth every morning (before breakfast) INDICATION: IRON SUPPLEMENT    Low iron stores       fluticasone 50 MCG/ACT spray    FLONASE    16 g    Spray 2 sprays in nostril At Bedtime INDICATION: TO CONTROL NASAL ALLERGY SYMPTOMS, DO NOT BLOW NOSE FOR 30 MINUTES AFTER USING    Other seasonal allergic rhinitis       folic acid 5 MG Caps     90 capsule    Take 5 mg by mouth daily May  dispense tablets    Combined B12 and folate deficiency anemia       MICROGESTIN FE 1.5/30 1.5-30 MG-MCG per tablet   Generic drug:  norethindrone-ethinyl estradiol-iron      Take 1 tablet by mouth        pyridOXINE 50 MG tablet    VITAMIN B-6    90 tablet    Take 1 tablet (50 mg) by mouth daily    Vitamin B6 deficiency

## 2017-07-20 ENCOUNTER — ALLIED HEALTH/NURSE VISIT (OUTPATIENT)
Dept: NURSING | Facility: CLINIC | Age: 37
End: 2017-07-20
Payer: COMMERCIAL

## 2017-07-20 DIAGNOSIS — E53.8 VITAMIN B12 DEFICIENCY WITHOUT ANEMIA: Primary | ICD-10-CM

## 2017-07-20 LAB
DQA1*: NORMAL
DQA1*LOCUS: NORMAL
DQB1* LOCUS: NORMAL
DQB1*: NORMAL
DRSSO COMMENTS: NORMAL
DRSSO TEST METHOD: NORMAL

## 2017-07-20 PROCEDURE — 96372 THER/PROPH/DIAG INJ SC/IM: CPT

## 2017-07-20 RX ORDER — CYANOCOBALAMIN 1000 UG/ML
1 INJECTION, SOLUTION INTRAMUSCULAR; SUBCUTANEOUS
Qty: 1 ML | Refills: 11 | COMMUNITY
Start: 2017-07-20 | End: 2019-04-26

## 2017-07-21 ENCOUNTER — ALLIED HEALTH/NURSE VISIT (OUTPATIENT)
Dept: NURSING | Facility: CLINIC | Age: 37
End: 2017-07-21
Payer: COMMERCIAL

## 2017-07-21 DIAGNOSIS — E53.8 VITAMIN B12 DEFICIENCY WITHOUT ANEMIA: Primary | ICD-10-CM

## 2017-07-21 LAB
ENDOMYSIUM IGA TITR SER IF: NORMAL {TITER}
GLIADIN PEPTIDE+TTG IGA+IGG SER QL IA: 9

## 2017-07-21 PROCEDURE — 96372 THER/PROPH/DIAG INJ SC/IM: CPT

## 2017-07-25 ENCOUNTER — TELEPHONE (OUTPATIENT)
Dept: FAMILY MEDICINE | Facility: CLINIC | Age: 37
End: 2017-07-25

## 2017-07-25 ENCOUNTER — ALLIED HEALTH/NURSE VISIT (OUTPATIENT)
Dept: NURSING | Facility: CLINIC | Age: 37
End: 2017-07-25
Payer: COMMERCIAL

## 2017-07-25 DIAGNOSIS — E53.8 VITAMIN B12 DEFICIENCY WITHOUT ANEMIA: Primary | ICD-10-CM

## 2017-07-25 PROCEDURE — 96372 THER/PROPH/DIAG INJ SC/IM: CPT

## 2017-07-25 NOTE — TELEPHONE ENCOUNTER
called back with patient on the line  Informed of message below  Patient will come back at 11:40PM  MA informed  Suzanne KAMARA

## 2017-07-25 NOTE — TELEPHONE ENCOUNTER
Pr Dr. Rebolledo's office, Pt is to get 7 B 12 injections in a row ang  to 1 weekly after. Left voice message for Pt to call the clinic. If Pt can return to clinic today, I will give her  B 12 injection as madyson as I am here. Charo Mcclelland, CMA

## 2017-07-26 ENCOUNTER — ALLIED HEALTH/NURSE VISIT (OUTPATIENT)
Dept: NURSING | Facility: CLINIC | Age: 37
End: 2017-07-26
Payer: COMMERCIAL

## 2017-07-26 DIAGNOSIS — E53.8 VITAMIN B12 DEFICIENCY WITHOUT ANEMIA: Primary | ICD-10-CM

## 2017-07-26 PROCEDURE — 96372 THER/PROPH/DIAG INJ SC/IM: CPT

## 2017-07-27 ENCOUNTER — TRANSFERRED RECORDS (OUTPATIENT)
Dept: HEALTH INFORMATION MANAGEMENT | Facility: CLINIC | Age: 37
End: 2017-07-27

## 2017-08-04 ENCOUNTER — ALLIED HEALTH/NURSE VISIT (OUTPATIENT)
Dept: NURSING | Facility: CLINIC | Age: 37
End: 2017-08-04
Payer: COMMERCIAL

## 2017-08-04 DIAGNOSIS — E53.8 VITAMIN B12 DEFICIENCY WITHOUT ANEMIA: Primary | ICD-10-CM

## 2017-08-04 PROCEDURE — 96372 THER/PROPH/DIAG INJ SC/IM: CPT

## 2017-08-04 NOTE — MR AVS SNAPSHOT
After Visit Summary   8/4/2017    Kalen Tracy    MRN: [id number]           Patient Information     Date Of Birth          1980        Visit Information        Provider Department      8/4/2017 3:30 PM [employer]; [facility] - NURSE [facility]        Today's Diagnoses     Vitamin B12 deficiency without anemia    -  1       Follow-ups after your visit        Your next 10 appointments already scheduled     Oct 16, 2017 11:30 AM CDT   Office Visit with Mcihael Rebolledo MD   [facility] ([facility])    [address] [id number] [phone]           Bring a current list of meds and any records pertaining to this visit. For Physicals, please bring immunization records and any forms needing to be filled out. Please arrive 10 minutes early to complete paperwork.              Who to contact     If you have questions or need follow up information about today's clinic visit or your schedule please contact [facility] directly at [phone].  Normal or non-critical lab and imaging results will be communicated to you by [employer], letter or phone within 4 business days after the clinic has received the results. If you do not hear from us within 7 days, please contact the clinic through [employer] or phone. If you have a critical or abnormal lab result, we will notify you by phone as soon as possible.  Submit refill requests through [employer] or call your pharmacy and they will forward the refill request to us. Please allow 3 business days for your refill to be completed.          Additional Information About Your Visit        MyChart Information     [employer] gives you secure access to your electronic health record. If you see a primary care provider, you can also send messages to your care team and make appointments. If you have questions, please call your primary  [facility].  If you do not have a primary care provider, please call [phone] and they will assist you.        Care EveryWhere ID     This is your Care EveryWhere ID. This could be used by other organizations to access your [place] medical records  [id number]        Your Vitals Were     Last Period                   07/10/2017            Blood Pressure from Last 3 Encounters:   07/17/17 100/70   01/16/17 118/74   09/26/16 110/62    Weight from Last 3 Encounters:   07/17/17 190 lb (86.2 kg)   01/16/17 190 lb 3.2 oz (86.3 kg)   09/26/16 190 lb (86.2 kg)              We Performed the Following     INJECTION INTRAMUSCULAR OR SUB-Q     VITAMIN B12 INJ /1000MCG        Primary Care Provider Office Phone # Fax #    Michael Rebolledo [phone]       [facility] [address] [facility] [address]        Equal Access to Services     ARASELI WALLACE AH: Hadii aad ku hadasho Soomaali, waaxda luqadaha, qaybta kaalmada adeegyada, waxay idiin hayaan amilcar stringer . So [facility] [phone].    ATENCIÓN: Si habla español, tiene a louis disposición servicios gratuitos de asistencia lingüística. Llame al [phone].    We comply with applicable federal civil rights laws and [place] laws. We do not discriminate on the basis of race, color, national origin, age, disability sex, sexual orientation or gender identity.            Thank you!     Thank you for choosing [facility]  for your care. Our goal is always to provide you with excellent care. Hearing back from our patients is one way we can continue to improve our services. Please take a few minutes to complete the written survey that you may receive in the mail after your visit with us. Thank you!             Your Updated Medication List - Protect others around you: Learn how to safely use, store and throw away your medicines at www.disposemymeds.org.          This list is accurate as of: 8/4/17  4:00 PM.  Always use  your most recent med list.                   Brand Name Dispense Instructions for use Diagnosis    AIRBORNE Tbef     30 tablet    Take 1 tablet by mouth every morning INDICATION: VITAMIN C SUPPLEMENT    Vitamin B6 deficiency       Calcium Carb-Cholecalciferol 1000-800 MG-UNIT Tabs    CALCIUM 1000 + D    100 tablet    Take 1 tablet by mouth daily TAKE WITH FOOD, FOR BONE HEALTH AND LOW VITAMIN D ([facility])    Vitamin D deficiency       cetirizine 10 MG tablet    zyrTEC    90 tablet    Take 1 tablet (10 mg) by mouth daily TO TREAT NASAL ALLERGIES    Allergic rhinitis       cholecalciferol 5000 UNITS Caps     100 capsule    Take 1 capsule (5,000 Units) by mouth daily FOR VITAMIN D DEFICIENCY (LOW VITAMIN D)    Vitamin D deficiency       * cyanocobalamin 1000 MCG/ML injection    VITAMIN B12    30 mL    Inject 1 mL (1,000 mcg) into the muscle every 30 days DAILY FOR 7 DAYS, THEN EVERY WEEK FOR A MONTH, THEN EVERY TWO WEEKS TILL SEEN INDICATION: FOR VITAMIN B12 SUPPLEMENTATION    Neuropathy (H), Combined B12 and folate deficiency anemia       * cyanocobalamin 1000 MCG/ML injection    VITAMIN B12    1 mL    Inject 1 mL (1,000 mcg) into the muscle every 30 days        ferrous fumarate 65 mg ([place]. FE)-Vitamin C 125 mg  MG Tabs tablet    VITRON C    100 tablet    Take 1 tablet by mouth every morning (before breakfast) INDICATION: IRON SUPPLEMENT    Low iron stores       fluticasone 50 MCG/ACT spray    FLONASE    16 g    Spray 2 sprays in nostril At Bedtime INDICATION: TO CONTROL NASAL ALLERGY SYMPTOMS, DO NOT BLOW NOSE FOR 30 MINUTES AFTER USING    Other seasonal allergic rhinitis       folic acid 5 MG Caps     90 capsule    Take 5 mg by mouth daily May dispense tablets    Combined B12 and folate deficiency anemia       MICROGESTIN FE 1.5/30 1.5-30 MG-MCG per tablet   Generic drug:  norethindrone-ethinyl estradiol-iron      Take 1 tablet by mouth        pyridOXINE 50 MG tablet    VITAMIN B-6    90  tablet    Take 1 tablet (50 mg) by mouth daily    Vitamin B6 deficiency       * Notice:  This list has 2 medication(s) that are the same as other medications prescribed for you. Read the directions carefully, and ask your doctor or other care provider to review them with you.

## 2017-08-04 NOTE — NURSING NOTE
Per orders of Dr. zelaya, injection of B 12 given by Victoria Jones. Patient instructed to remain in clinic for 15 minutes afterwards, and to report any adverse reaction to me immediately.

## 2017-08-18 ENCOUNTER — ALLIED HEALTH/NURSE VISIT (OUTPATIENT)
Dept: NURSING | Facility: CLINIC | Age: 37
End: 2017-08-18

## 2017-08-18 DIAGNOSIS — E53.8 VITAMIN B12 DEFICIENCY WITHOUT ANEMIA: Primary | ICD-10-CM

## 2017-08-18 RX ORDER — CYANOCOBALAMIN 1000 UG/ML
1 INJECTION, SOLUTION INTRAMUSCULAR; SUBCUTANEOUS
Qty: 1 ML | Refills: 11 | COMMUNITY
Start: 2017-08-18 | End: 2020-04-22

## 2017-09-06 ENCOUNTER — ALLIED HEALTH/NURSE VISIT (OUTPATIENT)
Dept: NURSING | Facility: CLINIC | Age: 37
End: 2017-09-06
Payer: COMMERCIAL

## 2017-09-06 DIAGNOSIS — E53.8 VITAMIN B12 DEFICIENCY WITHOUT ANEMIA: Primary | ICD-10-CM

## 2017-09-06 PROCEDURE — 90472 IMMUNIZATION ADMIN EACH ADD: CPT

## 2017-09-06 PROCEDURE — 99207 ZZC NO CHARGE NURSE ONLY: CPT

## 2017-09-06 NOTE — MR AVS SNAPSHOT
After Visit Summary   9/6/2017    Kalen Tracy    MRN: [id number]           Patient Information     Date Of Birth          1980        Visit Information        Provider Department      9/6/2017 3:00 PM [employer]; MALVIN DOMINGUEZ/LPN [facility]        Today's Diagnoses     Vitamin B12 deficiency without anemia    -  1       Follow-ups after your visit        Your next 10 appointments already scheduled     Oct 16, 2017 11:30 AM CDT   Office Visit with Michael Rebolledo MD   [facility]    [address] [address] [phone]           Bring a current list of meds and any records pertaining to this visit. For Physicals, please bring immunization records and any forms needing to be filled out. Please arrive 10 minutes early to complete paperwork.              Who to contact     If you have questions or need follow up information about today's clinic visit or your schedule please contact [facility] directly at [phone].  Normal or non-critical lab and imaging results will be communicated to you by [employer], letter or phone within 4 business days after the clinic has received the results. If you do not hear from us within 7 days, please contact the clinic through [employer] or phone. If you have a critical or abnormal lab result, we will notify you by phone as soon as possible.  Submit refill requests through [employer] or call your pharmacy and they will forward the refill request to us. Please allow 3 business days for your refill to be completed.          Additional Information About Your Visit        [employer] Information     [employer] gives you secure access to your electronic health record. If you see a primary care provider, you can also send messages to your care team and make appointments. If you have questions, please call your primary care clinic.  If you do  not have a primary care provider, please call [phone] and they will assist you.        Care EveryWhere ID     This is your Care EveryWhere ID. This could be used by other organizations to access your [place] medical records  [id number]         Blood Pressure from Last 3 Encounters:   07/17/17 100/70   01/16/17 118/74   09/26/16 110/62    Weight from Last 3 Encounters:   07/17/17 190 lb (86.2 kg)   01/16/17 190 lb 3.2 oz (86.3 kg)   09/26/16 190 lb (86.2 kg)              We Performed the Following     VITAMIN B12 INJ /1000MCG        Primary Care Provider Office Phone # Fax #    Michael Rebolledo [phone]       [address] [facility] [address]        Equal Access to Services     ARASELI WALLACE : Hadii aad akhil hadasho Sobrad, waaxda luqadaha, qaybta kaalmada amilcaryadm, rah stringer . So [facility] [phone].    ATENCIÓN: Si habla español, tiene a louis disposición servicios gratuitos de asistencia lingüística. Braulio al [phone].    We comply with applicable federal civil rights laws and [place] laws. We do not discriminate on the basis of race, color, national origin, age, disability sex, sexual orientation or gender identity.            Thank you!     [facility]  [facility]. Our goal is always to provide you with excellent care. Hearing back from our patients is one way we can continue to improve our services. Please take a few minutes to complete the written survey that you may receive in the mail after your visit with us. Thank you!             Your Updated Medication List - Protect others around you: Learn how to safely use, store and throw away your medicines at www.disposemymeds.org.          This list is accurate as of: 9/6/17  3:41 PM.  Always use your most recent med list.                   Brand Name Dispense Instructions for use Diagnosis    AIRBORNE Tbef     30 tablet    Take 1 tablet by mouth every  morning INDICATION: VITAMIN C SUPPLEMENT    Vitamin B6 deficiency       Calcium Carb-Cholecalciferol 1000-800 MG-UNIT Tabs    CALCIUM 1000 + D    100 tablet    Take 1 tablet by mouth daily TAKE WITH FOOD, FOR BONE HEALTH AND LOW VITAMIN D ([facility]    Vitamin D deficiency       cetirizine 10 MG tablet    zyrTEC    90 tablet    Take 1 tablet (10 mg) by mouth daily TO TREAT NASAL ALLERGIES    Allergic rhinitis       cholecalciferol 5000 UNITS Caps     100 capsule    Take 1 capsule (5,000 Units) by mouth daily FOR VITAMIN D DEFICIENCY (LOW VITAMIN D)    Vitamin D deficiency       * cyanocobalamin 1000 MCG/ML injection    VITAMIN B12    30 mL    Inject 1 mL (1,000 mcg) into the muscle every 30 days DAILY FOR 7 DAYS, THEN EVERY WEEK FOR A MONTH, THEN EVERY TWO WEEKS TILL SEEN INDICATION: FOR VITAMIN B12 SUPPLEMENTATION    Neuropathy (H), Combined B12 and folate deficiency anemia       * cyanocobalamin 1000 MCG/ML injection    VITAMIN B12    1 mL    Inject 1 mL (1,000 mcg) into the muscle every 30 days        * cyanocobalamin 1000 MCG/ML injection    VITAMIN B12    1 mL    Inject 1 mL (1,000 mcg) into the muscle every 14 days    Vitamin B12 deficiency without anemia       ferrous fumarate 65 mg ([place]. FE)-Vitamin C 125 mg  MG Tabs tablet    VITRON C    100 tablet    Take 1 tablet by mouth every morning (before breakfast) INDICATION: IRON SUPPLEMENT    Low iron stores       fluticasone 50 MCG/ACT spray    FLONASE    16 g    Spray 2 sprays in nostril At Bedtime INDICATION: TO CONTROL NASAL ALLERGY SYMPTOMS, DO NOT BLOW NOSE FOR 30 MINUTES AFTER USING    Other seasonal allergic rhinitis       folic acid 5 MG Caps     90 capsule    Take 5 mg by mouth daily May dispense tablets    Combined B12 and folate deficiency anemia       MICROGESTIN FE 1.5/30 1.5-30 MG-MCG per tablet   Generic drug:  norethindrone-ethinyl estradiol-iron      Take 1 tablet by mouth        pyridOXINE 50 MG tablet    VITAMIN  B-6    90 tablet    Take 1 tablet (50 mg) by mouth daily    Vitamin B6 deficiency       * Notice:  This list has 3 medication(s) that are the same as other medications prescribed for you. Read the directions carefully, and ask your doctor or other care provider to review them with you.

## 2017-09-06 NOTE — PROGRESS NOTES
Prior to injection verified patient identity using patient's name and date of birth.   injection of B12 given by Navya Ko. Patient instructed to remain in clinic for 15 minutes afterwards, and to report any adverse reaction to me immediately.  Navya eLrner, CMA

## 2017-10-18 ENCOUNTER — TRANSFERRED RECORDS (OUTPATIENT)
Dept: HEALTH INFORMATION MANAGEMENT | Facility: CLINIC | Age: 37
End: 2017-10-18

## 2017-12-27 ENCOUNTER — TELEPHONE (OUTPATIENT)
Dept: FAMILY MEDICINE | Facility: CLINIC | Age: 37
End: 2017-12-27

## 2018-01-18 ENCOUNTER — TRANSFERRED RECORDS (OUTPATIENT)
Dept: HEALTH INFORMATION MANAGEMENT | Facility: CLINIC | Age: 38
End: 2018-01-18

## 2018-01-24 ENCOUNTER — OFFICE VISIT (OUTPATIENT)
Dept: FAMILY MEDICINE | Facility: CLINIC | Age: 38
End: 2018-01-24
Payer: COMMERCIAL

## 2018-01-24 VITALS
SYSTOLIC BLOOD PRESSURE: 98 MMHG | WEIGHT: 186 LBS | BODY MASS INDEX: 27.55 KG/M2 | HEIGHT: 69 IN | DIASTOLIC BLOOD PRESSURE: 64 MMHG | TEMPERATURE: 98.3 F

## 2018-01-24 DIAGNOSIS — Z23 NEED FOR PROPHYLACTIC VACCINATION AND INOCULATION AGAINST INFLUENZA: Primary | ICD-10-CM

## 2018-01-24 DIAGNOSIS — E56.9 VITAMIN DEFICIENCY: ICD-10-CM

## 2018-01-24 DIAGNOSIS — Z00.00 ROUTINE ADULT HEALTH MAINTENANCE: ICD-10-CM

## 2018-01-24 PROBLEM — E54 SCURVY: Status: RESOLVED | Noted: 2017-01-16 | Resolved: 2018-01-24

## 2018-01-24 PROBLEM — R53.82 CHRONIC FATIGUE: Status: RESOLVED | Noted: 2017-01-16 | Resolved: 2018-01-24

## 2018-01-24 LAB
DEPRECATED CALCIDIOL+CALCIFEROL SERPL-MC: 39 UG/L (ref 20–75)
FOLATE SERPL-MCNC: 19.1 NG/ML
VIT B12 SERPL-MCNC: 724 PG/ML (ref 193–986)

## 2018-01-24 PROCEDURE — 99395 PREV VISIT EST AGE 18-39: CPT | Mod: 25 | Performed by: INTERNAL MEDICINE

## 2018-01-24 PROCEDURE — 82746 ASSAY OF FOLIC ACID SERUM: CPT | Performed by: INTERNAL MEDICINE

## 2018-01-24 PROCEDURE — 82728 ASSAY OF FERRITIN: CPT | Performed by: INTERNAL MEDICINE

## 2018-01-24 PROCEDURE — 82180 ASSAY OF ASCORBIC ACID: CPT | Mod: 90 | Performed by: INTERNAL MEDICINE

## 2018-01-24 PROCEDURE — 90471 IMMUNIZATION ADMIN: CPT | Performed by: INTERNAL MEDICINE

## 2018-01-24 PROCEDURE — 99000 SPECIMEN HANDLING OFFICE-LAB: CPT | Performed by: INTERNAL MEDICINE

## 2018-01-24 PROCEDURE — 80061 LIPID PANEL: CPT | Performed by: INTERNAL MEDICINE

## 2018-01-24 PROCEDURE — 82607 VITAMIN B-12: CPT | Performed by: INTERNAL MEDICINE

## 2018-01-24 PROCEDURE — 84207 ASSAY OF VITAMIN B-6: CPT | Mod: 90 | Performed by: INTERNAL MEDICINE

## 2018-01-24 PROCEDURE — 36415 COLL VENOUS BLD VENIPUNCTURE: CPT | Performed by: INTERNAL MEDICINE

## 2018-01-24 PROCEDURE — 82306 VITAMIN D 25 HYDROXY: CPT | Performed by: INTERNAL MEDICINE

## 2018-01-24 PROCEDURE — 90686 IIV4 VACC NO PRSV 0.5 ML IM: CPT | Performed by: INTERNAL MEDICINE

## 2018-01-24 NOTE — PROGRESS NOTES
SUBJECTIVE:   CC: Kalen Tracy is an 37 year old woman who presents for preventive health visit.     Lives with  and 4 kids.  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      Healthy Habits:    Do you get at least three servings of calcium containing foods daily (dairy, green leafy vegetables, etc.)? No     Amount of exercise or daily activities, outside of work: 0 day(s) per week    Problems taking medications regularly No    Medication side effects: sore throat after taking multivitamins, still taking them     Have you had an eye exam in the past two years? no    Do you see a dentist twice per year? yes    Do you have sleep apnea, excessive snoring or daytime drowsiness?no    Sore throat/submental area. Saw an ENT, told there was nothing wrong.    Vitamin deficiencies - on multiple supplements.  Testing for celiac was negative. Being on the supplements has really helped her energy.       Today's PHQ-2 Score:   PHQ-2 ( 1999 Pfizer) 1/24/2018 1/16/2017   Q1: Little interest or pleasure in doing things 0 0   Q2: Feeling down, depressed or hopeless 0 0   PHQ-2 Score 0 0       Abuse: Current or Past(Physical, Sexual or Emotional)- NO  Do you feel safe in your environment - YES    Social History   Substance Use Topics     Smoking status: Never Smoker     Smokeless tobacco: Never Used     Alcohol use No     If you drink alcohol do you typically have >3 drinks per day or >7 drinks per week? No                     Reviewed orders with patient.  Reviewed health maintenance and updated orders accordingly - Yes  Patient Active Problem List   Diagnosis     Cervical pain (neck)     GERD (gastroesophageal reflux disease)     Seasonal allergic rhinitis     Vitamin D deficiency     Allergic rhinitis     Ascorbic acid deficiency     Vitamin B6 deficiency     Neuropathy     Vitamin B12 deficiency (non anemic)     Folate deficiency     Past Surgical History:   Procedure Laterality Date      DILATION AND CURETTAGE SUCTION  8/14/2012    Procedure: DILATION AND CURETTAGE SUCTION;  Vacuum CURETTAGE;  Surgeon: Love Driver MD;  Location: [facility] OR     DILATION AND CURETTAGE, HYSTEROSCOPY DIAGNOSTIC, COMBINED         Social History   Substance Use Topics     Smoking status: Never Smoker     Smokeless tobacco: Never Used     Alcohol use No     Family History   Problem Relation Age of Onset     Family history unknown: Yes         Current Outpatient Prescriptions   Medication Sig Dispense Refill     cyanocobalamin (VITAMIN B12) 1000 MCG/ML injection Inject 1 mL (1,000 mcg) into the muscle every 14 days 1 mL 11     cyanocobalamin (VITAMIN B12) 1000 MCG/ML injection Inject 1 mL (1,000 mcg) into the muscle every 30 days 1 mL 11     cyanocobalamin (VITAMIN B12) 1000 MCG/ML injection Inject 1 mL (1,000 mcg) into the muscle every 30 days DAILY FOR 7 DAYS, THEN EVERY WEEK FOR A MONTH, THEN EVERY TWO WEEKS TILL SEEN INDICATION: FOR VITAMIN B12 SUPPLEMENTATION 30 mL 5     pyridOXINE (VITAMIN B-6) 50 MG tablet Take 1 tablet (50 mg) by mouth daily 90 tablet 3     folic acid 5 MG CAPS Take 5 mg by mouth daily May dispense tablets 90 capsule 3     ferrous fumarate 65 mg, [place]. FE,-Vitamin C 125 mg (VITRON C)  MG TABS tablet Take 1 tablet by mouth every morning (before breakfast) INDICATION: IRON SUPPLEMENT 100 tablet prn     cholecalciferol 5000 UNITS CAPS Take 1 capsule (5,000 Units) by mouth daily FOR VITAMIN D DEFICIENCY (LOW VITAMIN D) 100 capsule 3     Multiple Vitamins-Minerals (AIRBORNE) TBEF Take 1 tablet by mouth every morning INDICATION: VITAMIN C SUPPLEMENT 30 tablet 11     Calcium Carb-Cholecalciferol (CALCIUM 1000 + D) 1000-800 MG-UNIT TABS Take 1 tablet by mouth daily TAKE WITH FOOD, FOR BONE HEALTH AND LOW VITAMIN D ([facility] [place], [facility]) 100 tablet PRN     fluticasone (FLONASE) 50 MCG/ACT nasal spray Spray 2 sprays in nostril At Bedtime INDICATION: TO CONTROL NASAL ALLERGY SYMPTOMS, DO  "NOT BLOW NOSE FOR 30 MINUTES AFTER USING 16 g PRN     cetirizine (ZYRTEC) 10 MG tablet Take 1 tablet (10 mg) by mouth daily TO TREAT NASAL ALLERGIES 90 tablet 0     MICROGESTIN FE 1.5/30 1.5-30 MG-MCG tablet Take 1 tablet by mouth  3       Mammo discussed, not appropriate for or declined by this patient.    Pertinent mammograms are reviewed under the imaging tab.  History of abnormal Pap smear: NO - age 30- 65 PAP every 3 years recommended    Reviewed and updated as needed this visit by clinical staff  Tobacco  Allergies  Meds  Med Hx  Surg Hx  Fam Hx  Soc Hx        Reviewed and updated as needed this visit by Provider  Tobacco  Meds  Med Hx  Surg Hx  Fam Hx  Soc Hx           ROS:  C: NEGATIVE for fever, chills, change in weight  I: NEGATIVE for worrisome rashes, moles or lesions  E: NEGATIVE for vision changes or irritation  ENT: + throat pain. NEGATIVE for ear, mouth problems  R: NEGATIVE for significant cough or SOB  B: some pain in the right - mammogram and U/S done 10/2016 was normal, NEGATIVE for masses or discharge  CV: NEGATIVE for chest pain, palpitations or peripheral edema  GI: NEGATIVE for nausea, abdominal pain, heartburn, or change in bowel habits  : NEGATIVE for unusual urinary or vaginal symptoms. Periods are regular.  M: NEGATIVE for significant arthralgias or myalgia  N: NEGATIVE for weakness, dizziness or paresthesias  P: NEGATIVE for changes in mood or affect    OBJECTIVE:   BP 98/64 (BP Location: Left arm, Patient Position: Chair, Cuff Size: Adult Large)  Temp 98.3  F (36.8  C) (Tympanic)  Ht 5' 8.75\" (1.746 m)  Wt 186 lb (84.4 kg)  LMP 01/10/2018  BMI 27.67 kg/m2  EXAM:  GENERAL: healthy, alert and no distress  EYES: Eyes grossly normal to inspection, PERRL and conjunctivae and sclerae normal  HENT: ear canals and TM's normal, mouth without ulcers or lesions  NECK: no adenopathy, no asymmetry, masses, or scars and thyroid normal to palpation  RESP: lungs clear to auscultation " "- no rales, rhonchi or wheezes  BREAST: normal without masses, + tenderness on the right, no nipple discharge and no palpable axillary masses or adenopathy  CV: regular rate and rhythm, normal S1 S2, no S3 or S4, no murmur, click or rub, no peripheral edema and peripheral pulses strong  ABDOMEN: soft, nontender, and bowel sounds normal  MS: no gross musculoskeletal defects noted, no edema  SKIN: no suspicious lesions or rashes  NEURO: Normal strength and tone, mentation intact and speech normal  PSYCH: mentation appears normal, affect normal/bright    ASSESSMENT/PLAN:   1. Routine adult health maintenance  Would like lipids done today  - Lipid panel reflex to direct LDL Fasting    2. Need for prophylactic vaccination and inoculation against influenza  - Vaccine Administration, Initial [77630]  - ADMIN INFLUENZA (For MEDICARE Patients ONLY) []  - FLU VAC, SPLIT VIRUS IM > 3 YO (QUADRIVALENT) [80017]  - Vaccine Administration, Initial [10804]    3. Vitamin deficiency  - Folate  - Ferritin  - Vitamin D Deficiency  - Vitamin B6  - Vitamin B12  - Vitamin C  (would like letter with results)        COUNSELING:   Reviewed preventive health counseling, as reflected in patient instructions  Special attention given to:        Regular exercise       Healthy diet/nutrition       Osteoporosis Prevention/Bone Health              reports that she has never smoked. She has never used smokeless tobacco.    Estimated body mass index is 27.67 kg/(m^2) as calculated from the following:    Height as of this encounter: 5' 8.75\" (1.746 m).    Weight as of this encounter: 186 lb (84.4 kg).       Counseling Resources:  ATP IV Guidelines  Pooled Cohorts Equation Calculator  Breast Cancer Risk Calculator  FRAX Risk Assessment  ICSI Preventive Guidelines  Dietary Guidelines for Americans, 2010  USDA's MyPlate  ASA Prophylaxis  Lung CA Screening    Diane Baxter MD  [facility] [place]  "

## 2018-01-24 NOTE — PROGRESS NOTES

## 2018-01-24 NOTE — NURSING NOTE
"Chief Complaint   Patient presents with     Physical     fasting       Initial BP 98/64 (BP Location: Left arm, Patient Position: Chair, Cuff Size: Adult Large)  Temp 98.3  F (36.8  C) (Tympanic)  Ht 5' 8.75\" (1.746 m)  Wt 186 lb (84.4 kg)  LMP 01/10/2018  BMI 27.67 kg/m2 Estimated body mass index is 27.67 kg/(m^2) as calculated from the following:    Height as of this encounter: 5' 8.75\" (1.746 m).    Weight as of this encounter: 186 lb (84.4 kg).  Medication Reconciliation: complete  "

## 2018-01-24 NOTE — MR AVS SNAPSHOT
After Visit Summary   1/24/2018    Kalen Tracy    MRN: [id number]           Patient Information     Date Of Birth          1980        Visit Information        Provider Department      1/24/2018 9:25 AM Diane Baxter MD; [facility] [facility] [place]        Today's Diagnoses     Need for prophylactic vaccination and inoculation against influenza    -  1    Vitamin deficiency          Care Instructions      Preventive Health Recommendations  Female Ages 26 - 39  Yearly exam:   See your health care provider every year in order to    Review health changes.     Discuss preventive care.      Review your medicines if you your doctor has prescribed any.    Until age 30: Get a Pap test every three years (more often if you have had an abnormal result).    After age 30: Talk to your doctor about whether you should have a Pap test every 3 years or have a Pap test with HPV screening every 5 years.   You do not need a Pap test if your uterus was removed (hysterectomy) and you have not had cancer.  You should be tested each year for STDs (sexually transmitted diseases), if you're at risk.   Talk to your provider about how often to have your cholesterol checked.  If you are at risk for diabetes, you should have a diabetes test (fasting glucose).  Shots: Get a flu shot each year. Get a tetanus shot every 10 years.   Nutrition:     Eat at least 5 servings of fruits and vegetables each day.    Eat whole-grain bread, whole-wheat pasta and brown rice instead of white grains and rice.    Talk to your provider about Calcium and Vitamin D.     Lifestyle    Exercise at least 150 minutes a week (30 minutes a day, 5 days of the week). This will help you control your weight and prevent disease.    Limit alcohol to one drink per day.    No smoking.     Wear sunscreen to prevent skin cancer.    See your dentist every six months for an exam and cleaning.            Follow-ups after  "your visit        Follow-up notes from your care team     Return in about 1 year (around 1/24/2019).      Who to contact     If you have questions or need follow up information about today's clinic visit or your schedule please contact [facility] directly at [phone].  Normal or non-critical lab and imaging results will be communicated to you by MyChart, letter or phone within 4 business days after the clinic has received the results. If you do not hear from us within 7 days, please contact the clinic through [employer] or phone. If you have a critical or abnormal lab result, we will notify you by phone as soon as possible.  Submit refill requests through [employer] or call your pharmacy and they will forward the refill request to us. Please allow 3 business days for your refill to be completed.          Additional Information About Your Visit        MyChart Information     [employer] gives you secure access to your electronic health record. If you see a primary care provider, you can also send messages to your care team and make appointments. If you have questions, please call your primary care clinic.  If you do not have a primary care provider, please call [phone] and they will assist you.        Care EveryWhere ID     This is your Care EveryWhere ID. This could be used by other organizations to access your [place] medical records  [id number]        Your Vitals Were     Temperature Height Last Period BMI (Body Mass Index)          98.3  F (36.8  C) (Tympanic) 5' 8.75\" (1.746 m) 01/10/2018 27.67 kg/m2         Blood Pressure from Last 3 Encounters:   01/24/18 98/64   07/17/17 100/70   01/16/17 118/74    Weight from Last 3 Encounters:   01/24/18 186 lb (84.4 kg)   07/17/17 190 lb (86.2 kg)   01/16/17 190 lb 3.2 oz (86.3 kg)              We Performed the Following     Ferritin     Folate     Vitamin B12     Vitamin B6     Vitamin C     Vitamin D Deficiency        Primary Care Provider Office " Phone # Fax #    Michael Rebolledo [phone]       XXX RESIGNED XXX  [facility] [address]        Equal Access to Services     ARASELI WALLACE : Hadii aad ku hadmoodyxiomara Lockwood, wamikeda jaimeermiasha, qasteveta kajasonda isaiah, rah tuckeryuriy hernandezshabbir watt laVenessaarnoldo fuller. So [facility] [phone].    ATENCIÓN: Si habla español, tiene a louis disposición servicios gratuitos de asistencia lingüística. Llame al [phone].    We comply with applicable federal civil rights laws and [place] laws. We do not discriminate on the basis of race, color, national origin, age, disability, sex, sexual orientation, or gender identity.            Thank you!     [facility]. Our goal is always to provide you with excellent care. Hearing back from our patients is one way we can continue to improve our services. Please take a few minutes to complete the written survey that you may receive in the mail after your visit with us. Thank you!             Your Updated Medication List - Protect others around you: Learn how to safely use, store and throw away your medicines at www.disposemymeds.org.          This list is accurate as of 1/24/18 10:19 AM.  Always use your most recent med list.                   Brand Name Dispense Instructions for use Diagnosis    AIRBORNE Tbef     30 tablet    Take 1 tablet by mouth every morning INDICATION: VITAMIN C SUPPLEMENT    Vitamin B6 deficiency       Calcium Carb-Cholecalciferol 1000-800 MG-UNIT Tabs    CALCIUM 1000 + D    100 tablet    Take 1 tablet by mouth daily TAKE WITH FOOD, FOR BONE HEALTH AND LOW VITAMIN D (LAFO [facility]A, [facility])    Vitamin D deficiency       cetirizine 10 MG tablet    zyrTEC    90 tablet    Take 1 tablet (10 mg) by mouth daily TO TREAT NASAL ALLERGIES    Allergic rhinitis       cholecalciferol 5000 UNITS Caps     100 capsule    Take 1 capsule (5,000 Units) by mouth daily FOR VITAMIN D DEFICIENCY (LOW VITAMIN  D)    Vitamin D deficiency       * cyanocobalamin 1000 MCG/ML injection    VITAMIN B12    30 mL    Inject 1 mL (1,000 mcg) into the muscle every 30 days DAILY FOR 7 DAYS, THEN EVERY WEEK FOR A MONTH, THEN EVERY TWO WEEKS TILL SEEN INDICATION: FOR VITAMIN B12 SUPPLEMENTATION    Neuropathy, Combined B12 and folate deficiency anemia       * cyanocobalamin 1000 MCG/ML injection    VITAMIN B12    1 mL    Inject 1 mL (1,000 mcg) into the muscle every 30 days        * cyanocobalamin 1000 MCG/ML injection    VITAMIN B12    1 mL    Inject 1 mL (1,000 mcg) into the muscle every 14 days    Vitamin B12 deficiency without anemia       ferrous fumarate 65 mg ([place]. FE)-Vitamin C 125 mg  MG Tabs tablet    VITRON C    100 tablet    Take 1 tablet by mouth every morning (before breakfast) INDICATION: IRON SUPPLEMENT    Low iron stores       fluticasone 50 MCG/ACT spray    FLONASE    16 g    Spray 2 sprays in nostril At Bedtime INDICATION: TO CONTROL NASAL ALLERGY SYMPTOMS, DO NOT BLOW NOSE FOR 30 MINUTES AFTER USING    Other seasonal allergic rhinitis       folic acid 5 MG Caps     90 capsule    Take 5 mg by mouth daily May dispense tablets    Combined B12 and folate deficiency anemia       MICROGESTIN FE 1.5/30 1.5-30 MG-MCG per tablet   Generic drug:  norethindrone-ethinyl estradiol-iron      Take 1 tablet by mouth        pyridOXINE 50 MG tablet    VITAMIN B-6    90 tablet    Take 1 tablet (50 mg) by mouth daily    Vitamin B6 deficiency       * Notice:  This list has 3 medication(s) that are the same as other medications prescribed for you. Read the directions carefully, and ask your doctor or other care provider to review them with you.

## 2018-01-25 LAB
CHOLEST SERPL-MCNC: 155 MG/DL
FERRITIN SERPL-MCNC: 91 NG/ML (ref 12–150)
HDLC SERPL-MCNC: 67 MG/DL
LDLC SERPL CALC-MCNC: 77 MG/DL
NONHDLC SERPL-MCNC: 88 MG/DL
TRIGL SERPL-MCNC: 55 MG/DL

## 2018-01-26 LAB
VIT B6 SERPL-MCNC: 172.3 NMOL/L (ref 20–125)
VIT C SERPL-MCNC: 21 UMOL/L (ref 23–114)

## 2019-04-25 NOTE — PROGRESS NOTES
"  SUBJECTIVE:   Kalen Tracy is a 38 year old female who presents to clinic today for the following   health issues:    Kalen is here with fatigue.  She ran out of refills for all her supplements a few months ago so her fatigue has returned.  Would like to restart b12 injections today.  She does not follow any restricted diet.  She isn't getting any exercise currently.  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  She admits her sleep schedule is all over the place.  Interrupted at night and then takes a nap after she gets home from work.  But starting yesterday is going to work on not taking afternoon naps and consolidating her sleep at night.         Reviewed  and updated as needed this visit by clinical staff  Tobacco  Allergies  Meds  Med Hx  Surg Hx  Fam Hx  Soc Hx        Reviewed and updated as needed this visit by Provider              ROS:  Const, msk, psych, GI reviewed,  otherwise negative unless noted above.       OBJECTIVE:     /66 (BP Location: Left arm, Patient Position: Chair, Cuff Size: Adult Large)   Pulse 69   Temp 98  F (36.7  C) (Oral)   Ht 1.753 m (5' 9\")   Wt 92.5 kg (204 lb)   SpO2 99%   BMI 30.13 kg/m    Body mass index is 30.13 kg/m .  GENERAL: healthy, alert and no distress  PSYCH: mentation appears normal, affect normal/bright        ASSESSMENT/PLAN:         ICD-10-CM    1. Vitamin deficiency E56.9 Iron and iron binding capacity     Ferritin     Vitamin B12     CBC with platelets   2. Vitamin B12 deficiency (non anemic) E53.8 cyanocobalamin injection 1,000 mcg   3. 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 Low iron stores R79.0 ferrous fumarate 65 mg, [place]. FE,-Vitamin C 125 mg (VITRON C)  MG TABS tablet   5. Vitamin B6 deficiency E53.1 pyridOXINE (VITAMIN B-6) 50 MG tablet   6. Vitamin D deficiency E55.9 cholecalciferol 5000 units CAPS   7. Allergic rhinitis, unspecified " seasonality, unspecified trigger J30.9 cetirizine (ZYRTEC) 10 MG tablet   8. Other seasonal allergic rhinitis J30.2 fluticasone (FLONASE) 50 MCG/ACT nasal spray       Kalen had vitamin levels checked a few years ago and was started on numerous supplements, which do help her energy quite a bit when she takes them. Will check iron and b12 today, restart B12 injections.  Encouraged regular exercise and some time outside every day, hopefully making her sleep schedule more regular will help her energy as well.      F/U 3-4 months for HME, can check other vit levels at that visit.       Diane Baxter MD  [facility] [place]

## 2019-04-26 ENCOUNTER — TELEPHONE (OUTPATIENT)
Dept: FAMILY MEDICINE | Facility: CLINIC | Age: 39
End: 2019-04-26

## 2019-04-26 ENCOUNTER — OFFICE VISIT (OUTPATIENT)
Dept: FAMILY MEDICINE | Facility: CLINIC | Age: 39
End: 2019-04-26
Payer: COMMERCIAL

## 2019-04-26 VITALS
DIASTOLIC BLOOD PRESSURE: 66 MMHG | OXYGEN SATURATION: 99 % | SYSTOLIC BLOOD PRESSURE: 104 MMHG | TEMPERATURE: 98 F | WEIGHT: 204 LBS | HEIGHT: 69 IN | HEART RATE: 69 BPM | BODY MASS INDEX: 30.21 KG/M2

## 2019-04-26 DIAGNOSIS — E55.9 VITAMIN D DEFICIENCY: ICD-10-CM

## 2019-04-26 DIAGNOSIS — E56.9 VITAMIN DEFICIENCY: Primary | ICD-10-CM

## 2019-04-26 DIAGNOSIS — E53.8 VITAMIN B12 DEFICIENCY (NON ANEMIC): ICD-10-CM

## 2019-04-26 DIAGNOSIS — D53.1 COMBINED B12 AND FOLATE DEFICIENCY ANEMIA: ICD-10-CM

## 2019-04-26 DIAGNOSIS — R79.0 LOW IRON STORES: ICD-10-CM

## 2019-04-26 DIAGNOSIS — J30.2 OTHER SEASONAL ALLERGIC RHINITIS: ICD-10-CM

## 2019-04-26 DIAGNOSIS — H10.13 ALLERGIC CONJUNCTIVITIS OF BOTH EYES: Primary | ICD-10-CM

## 2019-04-26 DIAGNOSIS — J30.9 ALLERGIC RHINITIS, UNSPECIFIED SEASONALITY, UNSPECIFIED TRIGGER: ICD-10-CM

## 2019-04-26 DIAGNOSIS — E53.1 VITAMIN B6 DEFICIENCY: ICD-10-CM

## 2019-04-26 LAB
ERYTHROCYTE [DISTWIDTH] IN BLOOD BY AUTOMATED COUNT: 12.4 % (ref 10–15)
FERRITIN SERPL-MCNC: 83 NG/ML (ref 12–150)
HCT VFR BLD AUTO: 37.7 % (ref 35–47)
HGB BLD-MCNC: 12.9 G/DL (ref 11.7–15.7)
IRON SATN MFR SERPL: 41 % (ref 15–46)
IRON SERPL-MCNC: 124 UG/DL (ref 35–180)
MCH RBC QN AUTO: 32.5 PG (ref 26.5–33)
MCHC RBC AUTO-ENTMCNC: 34.2 G/DL (ref 31.5–36.5)
MCV RBC AUTO: 95 FL (ref 78–100)
PLATELET # BLD AUTO: 207 10E9/L (ref 150–450)
RBC # BLD AUTO: 3.97 10E12/L (ref 3.8–5.2)
TIBC SERPL-MCNC: 305 UG/DL (ref 240–430)
VIT B12 SERPL-MCNC: >6000 PG/ML (ref 193–986)
WBC # BLD AUTO: 4.5 10E9/L (ref 4–11)

## 2019-04-26 PROCEDURE — 36415 COLL VENOUS BLD VENIPUNCTURE: CPT | Performed by: INTERNAL MEDICINE

## 2019-04-26 PROCEDURE — 82607 VITAMIN B-12: CPT | Performed by: INTERNAL MEDICINE

## 2019-04-26 PROCEDURE — 96372 THER/PROPH/DIAG INJ SC/IM: CPT | Performed by: INTERNAL MEDICINE

## 2019-04-26 PROCEDURE — 99213 OFFICE O/P EST LOW 20 MIN: CPT | Mod: 25 | Performed by: INTERNAL MEDICINE

## 2019-04-26 PROCEDURE — 82728 ASSAY OF FERRITIN: CPT | Performed by: INTERNAL MEDICINE

## 2019-04-26 PROCEDURE — 83550 IRON BINDING TEST: CPT | Performed by: INTERNAL MEDICINE

## 2019-04-26 PROCEDURE — 83540 ASSAY OF IRON: CPT | Performed by: INTERNAL MEDICINE

## 2019-04-26 PROCEDURE — 85027 COMPLETE CBC AUTOMATED: CPT | Performed by: INTERNAL MEDICINE

## 2019-04-26 RX ORDER — FLUTICASONE PROPIONATE 50 MCG
2 SPRAY, SUSPENSION (ML) NASAL AT BEDTIME
Qty: 16 G | Refills: 11 | Status: SHIPPED | OUTPATIENT
Start: 2019-04-26 | End: 2020-04-22

## 2019-04-26 RX ORDER — OLOPATADINE HYDROCHLORIDE 1 MG/ML
1 SOLUTION/ DROPS OPHTHALMIC 2 TIMES DAILY
Qty: 5 ML | Refills: 3 | Status: SHIPPED | OUTPATIENT
Start: 2019-04-26 | End: 2020-04-22

## 2019-04-26 RX ORDER — CYANOCOBALAMIN 1000 UG/ML
1000 INJECTION, SOLUTION INTRAMUSCULAR; SUBCUTANEOUS
Status: ACTIVE | OUTPATIENT
Start: 2019-04-26

## 2019-04-26 RX ORDER — PYRIDOXINE HCL (VITAMIN B6) 50 MG
50 TABLET ORAL DAILY
Qty: 90 TABLET | Refills: 3 | Status: SHIPPED | OUTPATIENT
Start: 2019-04-26 | End: 2020-04-22

## 2019-04-26 RX ORDER — CETIRIZINE HYDROCHLORIDE 10 MG/1
10 TABLET ORAL DAILY
Qty: 90 TABLET | Refills: 3 | Status: SHIPPED | OUTPATIENT
Start: 2019-04-26 | End: 2020-05-12

## 2019-04-26 RX ADMIN — CYANOCOBALAMIN 1000 MCG: 1000 INJECTION, SOLUTION INTRAMUSCULAR; SUBCUTANEOUS at 12:58

## 2019-04-26 ASSESSMENT — MIFFLIN-ST. JEOR: SCORE: 1664.72

## 2019-04-26 NOTE — TELEPHONE ENCOUNTER
Patient calling because she was seen in clinic today but forgot to ask about an eye drop for seasonal allergies (eyes have been itching).    Olopatadine ([place]) 0.1% ([facility] was in 2010).    Routing to provider for review and advise as appropriate.  Pharmacy is pended.    Patient can be reached at: [phone]. Ok to leave message    HUNTER Bowling, RN  [employer]

## 2019-07-03 ENCOUNTER — OFFICE VISIT (OUTPATIENT)
Dept: FAMILY MEDICINE | Facility: CLINIC | Age: 39
End: 2019-07-03
Payer: COMMERCIAL

## 2019-07-03 VITALS
WEIGHT: 194 LBS | BODY MASS INDEX: 28.73 KG/M2 | SYSTOLIC BLOOD PRESSURE: 104 MMHG | DIASTOLIC BLOOD PRESSURE: 60 MMHG | TEMPERATURE: 97.5 F | HEIGHT: 69 IN | HEART RATE: 72 BPM | OXYGEN SATURATION: 98 %

## 2019-07-03 DIAGNOSIS — K13.29 HYPERKERATOTIC ORAL LESION: Primary | ICD-10-CM

## 2019-07-03 PROCEDURE — 99213 OFFICE O/P EST LOW 20 MIN: CPT | Performed by: FAMILY MEDICINE

## 2019-07-03 ASSESSMENT — MIFFLIN-ST. JEOR: SCORE: 1619.36

## 2019-07-03 NOTE — PROGRESS NOTES
Subjective     Kalen Tracy is a 38 year old female who presents to clinic today for the following health issues:    HPI   Concern - mass inside mouth  Onset: x 2 months    Description:   A similar lesion was removed 2 years ago and is now back.  Was told it was just a cyst previously     Intensity: mild  Progression of Symptoms:  Getting bigger in size. Occasionally hurt.   Therapies Tried and outcome:       Patient Active Problem List   Diagnosis     Cervical pain (neck)     GERD (gastroesophageal reflux disease)     Seasonal allergic rhinitis     Vitamin D deficiency     Allergic rhinitis     Ascorbic acid deficiency     Vitamin B6 deficiency     Neuropathy     Vitamin B12 deficiency (non anemic)     Folate deficiency     Past Surgical History:   Procedure Laterality Date     DILATION AND CURETTAGE SUCTION  8/14/2012    Procedure: DILATION AND CURETTAGE SUCTION;  Vacuum CURETTAGE;  Surgeon: Love Driver MD;  Location: [facility] OR     DILATION AND CURETTAGE, HYSTEROSCOPY DIAGNOSTIC, COMBINED         Social History     Tobacco Use     Smoking status: Never Smoker     Smokeless tobacco: Never Used   Substance Use Topics     Alcohol use: No     Alcohol/week: 0.0 oz     Family History   Family history unknown: Yes         Current Outpatient Medications   Medication Sig Dispense Refill     Calcium Carb-Cholecalciferol (CALCIUM 1000 + D) 1000-800 MG-UNIT TABS Take 1 tablet by mouth daily TAKE WITH FOOD, FOR BONE HEALTH AND LOW VITAMIN D (LAFO [facility]) 100 tablet PRN     cetirizine (ZYRTEC) 10 MG tablet Take 1 tablet (10 mg) by mouth daily TO TREAT NASAL ALLERGIES 90 tablet 3     cholecalciferol 5000 units CAPS Take 1 capsule (5,000 Units) by mouth daily FOR VITAMIN D DEFICIENCY (LOW VITAMIN D) 100 capsule 3     cyanocobalamin (VITAMIN B12) 1000 MCG/ML injection Inject 1 mL (1,000 mcg) into the muscle every 14 days 1 mL 11     fluticasone (FLONASE) 50 MCG/ACT nasal spray Spray 2 sprays in  "nostril At Bedtime INDICATION: TO CONTROL NASAL ALLERGY SYMPTOMS, DO NOT BLOW NOSE FOR 30 MINUTES AFTER USING 16 g 11     folic acid 5 MG CAPS Take 5 mg by mouth daily May dispense tablets 90 capsule 3     Multiple Vitamins-Minerals (AIRBORNE) TBEF Take 1 tablet by mouth every morning INDICATION: VITAMIN C SUPPLEMENT 30 tablet 11     olopatadine (PATANOL) 0.1 % ophthalmic solution Place 1 drop into both eyes 2 times daily 5 mL 3     pyridOXINE (VITAMIN B-6) 50 MG tablet Take 1 tablet (50 mg) by mouth daily 90 tablet 3     ferrous fumarate 65 mg, [place]. FE,-Vitamin C 125 mg (VITRON C)  MG TABS tablet Take 1 tablet by mouth every morning (before breakfast) INDICATION: IRON SUPPLEMENT (Patient not taking: Reported on 7/3/2019) 100 tablet 3     Allergies   Allergen Reactions     Zithromax [Azithromycin] Palpitations         Reviewed and updated as needed this visit by Provider          Objective    /60   Pulse 72   Temp 97.5  F (36.4  C) (Tympanic)   Ht 1.753 m (5' 9\")   Wt 88 kg (194 lb)   SpO2 98%   BMI 28.65 kg/m    Body mass index is 28.65 kg/m .  Physical Exam   GENERAL: healthy, alert and no distress  HENT:  mouth with a keratotic lesions located on the buccal mucosa just close to the left angle of the mouth. No surrounding erythema.  RESP: lungs clear to auscultation - no rales, rhonchi or wheezes  CV: regular rate and rhythm, normal S1 S2, no S3 or S4, no murmur, click or rub, no peripheral edema and peripheral pulses strong            Assessment & Plan     1. Hyperkeratotic oral lesion  Recommended to see oral surgery for excision of the keratotic lesion and management.    - ORAL SURGERY REFERRAL     BMI:   Estimated body mass index is 28.65 kg/m  as calculated from the following:    Height as of this encounter: 1.753 m (5' 9\").    Weight as of this encounter: 88 kg (194 lb).         Return in about 2 months (around 9/3/2019) for Annual Physical Exam.    Rochelle Delgado MD  [facility] [place]

## 2019-09-06 DIAGNOSIS — O03.9 COMPLETE ABORTION: ICD-10-CM

## 2019-09-06 PROCEDURE — 84702 CHORIONIC GONADOTROPIN TEST: CPT | Performed by: OBSTETRICS & GYNECOLOGY

## 2019-09-06 PROCEDURE — 36415 COLL VENOUS BLD VENIPUNCTURE: CPT | Performed by: OBSTETRICS & GYNECOLOGY

## 2019-09-07 LAB — B-HCG SERPL-ACNC: 16 IU/L (ref 0–5)

## 2020-04-22 RX ORDER — ACETAMINOPHEN 325 MG/1
325-650 TABLET ORAL EVERY 6 HOURS PRN
COMMUNITY
End: 2022-04-13

## 2020-04-22 RX ORDER — NORETHINDRONE ACETATE AND ETHINYL ESTRADIOL 1.5-30(21)
1 KIT ORAL DAILY
COMMUNITY
End: 2020-04-22

## 2020-04-22 NOTE — PROGRESS NOTES
PTA medications updated by Medication Scribe day before surgery via phone call with patient      -LAST DOSES ENTERED BY NURSE-    Medication history sources: Patient, Surescripts and H&P  Medication history source reliability: Good  Adherence assessment: N/A Not Observed    Significant changes made to the medication list:  None      Additional medication history information:   None        Prior to Admission medications    Medication Sig Last Dose Taking? Auth Provider   acetaminophen (TYLENOL) 325 MG tablet Take 325-650 mg by mouth every 6 hours as needed for mild pain  Yes Reported, Patient   cetirizine (ZYRTEC) 10 MG tablet Take 1 tablet (10 mg) by mouth daily TO TREAT NASAL ALLERGIES  Yes Diane Baxter MD

## 2020-04-23 ENCOUNTER — ANESTHESIA (OUTPATIENT)
Dept: SURGERY | Facility: CLINIC | Age: 40
End: 2020-04-23
Payer: COMMERCIAL

## 2020-04-23 ENCOUNTER — HOSPITAL ENCOUNTER (OUTPATIENT)
Facility: CLINIC | Age: 40
Discharge: HOME | End: 2020-04-23
Attending: OBSTETRICS & GYNECOLOGY | Admitting: OBSTETRICS & GYNECOLOGY
Payer: COMMERCIAL

## 2020-04-23 ENCOUNTER — ANESTHESIA EVENT (OUTPATIENT)
Dept: SURGERY | Facility: CLINIC | Age: 40
End: 2020-04-23
Payer: COMMERCIAL

## 2020-04-23 VITALS
DIASTOLIC BLOOD PRESSURE: 60 MMHG | OXYGEN SATURATION: 100 % | RESPIRATION RATE: 16 BRPM | BODY MASS INDEX: 29.93 KG/M2 | WEIGHT: 202.1 LBS | SYSTOLIC BLOOD PRESSURE: 99 MMHG | HEIGHT: 69 IN | TEMPERATURE: 97 F | HEART RATE: 49 BPM

## 2020-04-23 DIAGNOSIS — O02.1 MISSED ABORTION: Primary | ICD-10-CM

## 2020-04-23 LAB — B-HCG SERPL-ACNC: 282 IU/L (ref 0–5)

## 2020-04-23 PROCEDURE — 37000008 ZZH ANESTHESIA TECHNICAL FEE, 1ST 30 MIN: Performed by: OBSTETRICS & GYNECOLOGY

## 2020-04-23 PROCEDURE — 25000128 H RX IP 250 OP 636: Performed by: NURSE ANESTHETIST, CERTIFIED REGISTERED

## 2020-04-23 PROCEDURE — 25800030 ZZH RX IP 258 OP 636: Performed by: OBSTETRICS & GYNECOLOGY

## 2020-04-23 PROCEDURE — 25000125 ZZHC RX 250: Performed by: OBSTETRICS & GYNECOLOGY

## 2020-04-23 PROCEDURE — 88233 TISSUE CULTURE SKIN/BIOPSY: CPT | Performed by: OBSTETRICS & GYNECOLOGY

## 2020-04-23 PROCEDURE — 25000566 ZZH SEVOFLURANE, EA 15 MIN: Performed by: OBSTETRICS & GYNECOLOGY

## 2020-04-23 PROCEDURE — 36415 COLL VENOUS BLD VENIPUNCTURE: CPT | Performed by: OBSTETRICS & GYNECOLOGY

## 2020-04-23 PROCEDURE — 36000052 ZZH SURGERY LEVEL 2 EA 15 ADDTL MIN: Performed by: OBSTETRICS & GYNECOLOGY

## 2020-04-23 PROCEDURE — 71000012 ZZH RECOVERY PHASE 1 LEVEL 1 FIRST HR: Performed by: OBSTETRICS & GYNECOLOGY

## 2020-04-23 PROCEDURE — 71000027 ZZH RECOVERY PHASE 2 EACH 15 MINS: Performed by: OBSTETRICS & GYNECOLOGY

## 2020-04-23 PROCEDURE — 25000125 ZZHC RX 250: Performed by: NURSE ANESTHETIST, CERTIFIED REGISTERED

## 2020-04-23 PROCEDURE — 27210794 ZZH OR GENERAL SUPPLY STERILE: Performed by: OBSTETRICS & GYNECOLOGY

## 2020-04-23 PROCEDURE — 00000159 ZZHCL STATISTIC H-SEND OUTS PREP: Performed by: OBSTETRICS & GYNECOLOGY

## 2020-04-23 PROCEDURE — 40000170 ZZH STATISTIC PRE-PROCEDURE ASSESSMENT II: Performed by: OBSTETRICS & GYNECOLOGY

## 2020-04-23 PROCEDURE — 40000892 ZZHCL STATISTIC DNA ISOLATION: Performed by: OBSTETRICS & GYNECOLOGY

## 2020-04-23 PROCEDURE — 36000050 ZZH SURGERY LEVEL 2 1ST 30 MIN: Performed by: OBSTETRICS & GYNECOLOGY

## 2020-04-23 PROCEDURE — 88305 TISSUE EXAM BY PATHOLOGIST: CPT | Mod: 26 | Performed by: OBSTETRICS & GYNECOLOGY

## 2020-04-23 PROCEDURE — 88305 TISSUE EXAM BY PATHOLOGIST: CPT | Performed by: OBSTETRICS & GYNECOLOGY

## 2020-04-23 PROCEDURE — 84702 CHORIONIC GONADOTROPIN TEST: CPT | Performed by: OBSTETRICS & GYNECOLOGY

## 2020-04-23 PROCEDURE — 25800030 ZZH RX IP 258 OP 636: Performed by: ANESTHESIOLOGY

## 2020-04-23 PROCEDURE — 88262 CHROMOSOME ANALYSIS 15-20: CPT | Performed by: OBSTETRICS & GYNECOLOGY

## 2020-04-23 PROCEDURE — 37000009 ZZH ANESTHESIA TECHNICAL FEE, EACH ADDTL 15 MIN: Performed by: OBSTETRICS & GYNECOLOGY

## 2020-04-23 RX ORDER — HYDROMORPHONE HYDROCHLORIDE 1 MG/ML
.3-.5 INJECTION, SOLUTION INTRAMUSCULAR; INTRAVENOUS; SUBCUTANEOUS EVERY 5 MIN PRN
Status: DISCONTINUED | OUTPATIENT
Start: 2020-04-23 | End: 2020-04-23 | Stop reason: HOSPADM

## 2020-04-23 RX ORDER — FENTANYL CITRATE 50 UG/ML
INJECTION, SOLUTION INTRAMUSCULAR; INTRAVENOUS PRN
Status: DISCONTINUED | OUTPATIENT
Start: 2020-04-23 | End: 2020-04-23

## 2020-04-23 RX ORDER — PROPOFOL 10 MG/ML
INJECTION, EMULSION INTRAVENOUS PRN
Status: DISCONTINUED | OUTPATIENT
Start: 2020-04-23 | End: 2020-04-23

## 2020-04-23 RX ORDER — ONDANSETRON 2 MG/ML
4 INJECTION INTRAMUSCULAR; INTRAVENOUS EVERY 30 MIN PRN
Status: DISCONTINUED | OUTPATIENT
Start: 2020-04-23 | End: 2020-04-23 | Stop reason: HOSPADM

## 2020-04-23 RX ORDER — SODIUM CHLORIDE, SODIUM LACTATE, POTASSIUM CHLORIDE, CALCIUM CHLORIDE 600; 310; 30; 20 MG/100ML; MG/100ML; MG/100ML; MG/100ML
INJECTION, SOLUTION INTRAVENOUS CONTINUOUS
Status: DISCONTINUED | OUTPATIENT
Start: 2020-04-23 | End: 2020-04-23 | Stop reason: HOSPADM

## 2020-04-23 RX ORDER — ONDANSETRON 2 MG/ML
INJECTION INTRAMUSCULAR; INTRAVENOUS PRN
Status: DISCONTINUED | OUTPATIENT
Start: 2020-04-23 | End: 2020-04-23

## 2020-04-23 RX ORDER — MAGNESIUM HYDROXIDE 1200 MG/15ML
LIQUID ORAL PRN
Status: DISCONTINUED | OUTPATIENT
Start: 2020-04-23 | End: 2020-04-23 | Stop reason: HOSPADM

## 2020-04-23 RX ORDER — DEXAMETHASONE SODIUM PHOSPHATE 4 MG/ML
INJECTION, SOLUTION INTRA-ARTICULAR; INTRALESIONAL; INTRAMUSCULAR; INTRAVENOUS; SOFT TISSUE PRN
Status: DISCONTINUED | OUTPATIENT
Start: 2020-04-23 | End: 2020-04-23

## 2020-04-23 RX ORDER — ONDANSETRON 4 MG/1
4 TABLET, ORALLY DISINTEGRATING ORAL EVERY 30 MIN PRN
Status: DISCONTINUED | OUTPATIENT
Start: 2020-04-23 | End: 2020-04-23 | Stop reason: HOSPADM

## 2020-04-23 RX ORDER — GLYCOPYRROLATE 0.2 MG/ML
INJECTION, SOLUTION INTRAMUSCULAR; INTRAVENOUS PRN
Status: DISCONTINUED | OUTPATIENT
Start: 2020-04-23 | End: 2020-04-23

## 2020-04-23 RX ORDER — NEOSTIGMINE METHYLSULFATE 1 MG/ML
VIAL (ML) INJECTION PRN
Status: DISCONTINUED | OUTPATIENT
Start: 2020-04-23 | End: 2020-04-23

## 2020-04-23 RX ORDER — ACETAMINOPHEN 325 MG/1
650 TABLET ORAL
Status: DISCONTINUED | OUTPATIENT
Start: 2020-04-23 | End: 2020-04-23 | Stop reason: HOSPADM

## 2020-04-23 RX ORDER — ACETAMINOPHEN 325 MG/1
325-650 TABLET ORAL EVERY 6 HOURS PRN
Qty: 50 TABLET | Refills: 0 | COMMUNITY
Start: 2020-04-23 | End: 2022-04-13

## 2020-04-23 RX ORDER — FENTANYL CITRATE 50 UG/ML
25-50 INJECTION, SOLUTION INTRAMUSCULAR; INTRAVENOUS
Status: DISCONTINUED | OUTPATIENT
Start: 2020-04-23 | End: 2020-04-23 | Stop reason: HOSPADM

## 2020-04-23 RX ORDER — IBUPROFEN 600 MG/1
600 TABLET, FILM COATED ORAL EVERY 6 HOURS PRN
Qty: 20 TABLET | Refills: 0 | Status: SHIPPED | OUTPATIENT
Start: 2020-04-23 | End: 2022-04-13

## 2020-04-23 RX ORDER — LIDOCAINE HYDROCHLORIDE 20 MG/ML
INJECTION, SOLUTION INFILTRATION; PERINEURAL PRN
Status: DISCONTINUED | OUTPATIENT
Start: 2020-04-23 | End: 2020-04-23

## 2020-04-23 RX ORDER — NALOXONE HYDROCHLORIDE 0.4 MG/ML
.1-.4 INJECTION, SOLUTION INTRAMUSCULAR; INTRAVENOUS; SUBCUTANEOUS
Status: CANCELLED | OUTPATIENT
Start: 2020-04-23 | End: 2020-04-24

## 2020-04-23 RX ORDER — KETOROLAC TROMETHAMINE 30 MG/ML
INJECTION, SOLUTION INTRAMUSCULAR; INTRAVENOUS PRN
Status: DISCONTINUED | OUTPATIENT
Start: 2020-04-23 | End: 2020-04-23

## 2020-04-23 RX ADMIN — KETOROLAC TROMETHAMINE 30 MG: 30 INJECTION, SOLUTION INTRAMUSCULAR at 11:18

## 2020-04-23 RX ADMIN — ROCURONIUM BROMIDE 25 MG: 10 INJECTION INTRAVENOUS at 10:42

## 2020-04-23 RX ADMIN — DOXYCYCLINE 100 MG: 100 INJECTION, POWDER, LYOPHILIZED, FOR SOLUTION INTRAVENOUS at 10:47

## 2020-04-23 RX ADMIN — FENTANYL CITRATE 50 MCG: 50 INJECTION, SOLUTION INTRAMUSCULAR; INTRAVENOUS at 10:42

## 2020-04-23 RX ADMIN — FENTANYL CITRATE 50 MCG: 50 INJECTION, SOLUTION INTRAMUSCULAR; INTRAVENOUS at 11:28

## 2020-04-23 RX ADMIN — MIDAZOLAM 2 MG: 1 INJECTION INTRAMUSCULAR; INTRAVENOUS at 10:37

## 2020-04-23 RX ADMIN — LIDOCAINE HYDROCHLORIDE 100 MG: 20 INJECTION, SOLUTION INFILTRATION; PERINEURAL at 10:42

## 2020-04-23 RX ADMIN — GLYCOPYRROLATE 0.6 MG: 0.2 INJECTION, SOLUTION INTRAMUSCULAR; INTRAVENOUS at 11:20

## 2020-04-23 RX ADMIN — SODIUM CHLORIDE, POTASSIUM CHLORIDE, SODIUM LACTATE AND CALCIUM CHLORIDE: 600; 310; 30; 20 INJECTION, SOLUTION INTRAVENOUS at 09:05

## 2020-04-23 RX ADMIN — ONDANSETRON 4 MG: 2 INJECTION INTRAMUSCULAR; INTRAVENOUS at 10:47

## 2020-04-23 RX ADMIN — NEOSTIGMINE METHYLSULFATE 4 MG: 1 INJECTION, SOLUTION INTRAVENOUS at 11:20

## 2020-04-23 RX ADMIN — PROPOFOL 200 MG: 10 INJECTION, EMULSION INTRAVENOUS at 10:42

## 2020-04-23 RX ADMIN — DEXAMETHASONE SODIUM PHOSPHATE 4 MG: 4 INJECTION, SOLUTION INTRA-ARTICULAR; INTRALESIONAL; INTRAMUSCULAR; INTRAVENOUS; SOFT TISSUE at 10:46

## 2020-04-23 SDOH — HEALTH STABILITY: MENTAL HEALTH: CURRENT SMOKER: 0

## 2020-04-23 ASSESSMENT — LIFESTYLE VARIABLES: TOBACCO_USE: 0

## 2020-04-23 ASSESSMENT — MIFFLIN-ST. JEOR: SCORE: 1651.1

## 2020-04-23 NOTE — OP NOTE
Operative Note    Preoperative diagnosis:  Missed  measuring 8 weeks  Postoperative diagnosis:  Same  Procedure:  Suction  dilation and curettage  Surgeon:  Carmela Arnold MD    Anesthesia:  GETA  Complications: None  Specimens:  Retained products of conception  Condition:  Stable to PACU    EBL:  30 mL  IVF:  700 mL, crystalloid  UOP:  200 mL, clear yellow    Findings:  Anteverted uterus, midline.  Normal appearing cervix.  No blood in the vagina.  Moderate return to tissue with passage.    Indication:  Kalen Tracy is a 39 year old  who presented to clinic and had ultrasound notable for a missed  measuring at 8 weeks.  Options for management where discussed including expectant, medical and surgical management.  The patient elected to proceed with suction D&C.  Risks, benefits and alternatives were discussed.  Informed consent was signed.    Procedure:  The patient was taken to the operating room where she underwent GETA anesthesia without difficulty. She was placed in a deep dorsal lithotomy position using yellow fin stirrups. The patient was examined for the above noted findings and then prepped and draped in the usual sterile fashion. A speculum was inserted into the vagina and the findings noted above were seen.  A tenaculum was placed on the anterior cervical lip.  The endocervical canal was serially dilated to 8 mm.  The suction device was then activated and a size 8 suction curette was placed into the cervical canal.  The curette was rotated to clear the uterus. There was return of tissue. Several more passes were made with removal of tissue with each pass.  A sharp curettage was then performed.  The suction curette was re-introduced and the endometrial cavity cleared.  All instruments were then removed. The tenaculum was removed from the cervix.  The tenaculum site was then noted to be hemostatic.  There was minimal bleeding coming from the cervical os at the end of the procedure.  The patient was repositioned to the supine position. The patient tolerated the procedure well and was taken to the recovery room in stable condition.     Carmela Arnold MD  [facility]  11:26 AM 4/23/2020

## 2020-04-23 NOTE — ANESTHESIA POSTPROCEDURE EVALUATION
Patient: Kalen Tracy    Procedure(s):  SUCTION DILATION AND CURETTAGE    Diagnosis:Missed ab [O02.1]  Diagnosis Additional Information: No value filed.    Anesthesia Type:  General    Note:  Anesthesia Post Evaluation    Patient location during evaluation: PACU  Patient participation: Able to fully participate in evaluation  Level of consciousness: awake and alert  Pain management: adequate  Airway patency: patent  Cardiovascular status: acceptable and stable  Respiratory status: acceptable, spontaneous ventilation, unassisted and nonlabored ventilation  Hydration status: acceptable  PONV: none             Last vitals:  Vitals:    04/23/20 1200 04/23/20 1210 04/23/20 1220   BP: 95/64 98/60 95/57   Pulse: 61 (!) 49 (!) 49   Resp: 15 18 14   Temp:      SpO2: 99% 99% 100%         Electronically Signed By: Clayton Bradley MD  April 23, 2020  12:30 PM

## 2020-04-23 NOTE — DISCHARGE INSTRUCTIONS
Same Day Surgery Discharge Instructions for  Sedation and General Anesthesia       It's not unusual to feel dizzy, light-headed or faint for up to 24 hours after surgery or while taking pain medication.  If you have these symptoms: sit for a few minutes before standing and have someone assist you when you get up to walk or use the bathroom.      You should rest and relax for the next 24 hours. We recommend you make arrangements to have an adult stay with you for at least 24 hours after your discharge.  Avoid hazardous and strenuous activity.      DO NOT DRIVE any vehicle or operate mechanical equipment for 24 hours following the end of your surgery.  Even though you may feel normal, your reactions may be affected by the medication you have received.      Do not drink alcoholic beverages for 24 hours following surgery.       Slowly progress to your regular diet as you feel able. It's not unusual to feel nauseated and/or vomit after receiving anesthesia.  If you develop these symptoms, drink clear liquids (apple juice, ginger ale, broth, 7-up, etc. ) until you feel better.  If your nausea and vomiting persists for 24 hours, please notify your surgeon.        All narcotic pain medications, along with inactivity and anesthesia, can cause constipation. Drinking plenty of liquids and increasing fiber intake will help.      For any questions of a medical nature, call your surgeon.      Do not make important decisions for 24 hours.      If you had general anesthesia, you may have a sore throat for a couple of days related to the breathing tube used during surgery.  You may use Cepacol lozenges to help with this discomfort.  If it worsens or if you develop a fever, contact your surgeon.       If you feel your pain is not well managed with the pain medications prescribed by your surgeon, please contact your surgeon's office to let them know so they can address your concerns.       CoVid 19 Information    We want to give you  information regarding Covid. Please consult your primary care provider with any questions you might have.     Patient who have symptoms (cough, fever, or shortness of breath), need to isolate for 7 days from when symptoms started OR 72 hours after fever resolves (without fever reducing medications) AND improvement of respiratory symptoms (whichever is longer).      Isolate yourself at home (in own room/own bathroom if possible)    Do Not allow any visitors    Do Not go to work or school    Do Not go to [employer],  centers, shopping, or other public places.    Do Not shake hands.    Avoid close and intimate contact with others (hugging, kissing).    Follow CDC recommendations for household cleaning of frequently touched services.     After the initial 7 days, continue to isolate yourself from household members as much as possible. To continue decrease the risk of community spread and exposure, you and any members of your household should limit activities in public for 14 days after starting home isolation.     You can reference the following CDC link for helpful home isolation/care tips:  https://www.cdc.gov/coronavirus/2019-ncov/downloads/10Things.pdf    Protect Others:    Cover Your Mouth and Nose with a mask, disposable tissue or wash cloth to avoid spreading germs to others.    Wash your hands and face frequently with soap and water    Call Your Primary Doctor If: Breathing difficulty develops or you become worse.    For more information about COVID19 and options for caring for yourself at home, please visit the CDC website at https://www.cdc.gov/coronavirus/2019-ncov/about/steps-when-sick.html  For more options for care at [facility], please visit our website at [email or web address]    Today you received Toradol, an antiinflammatory medication similar to Ibuprofen.  You should not take other antiinflammatory medication, such as Ibuprofen, Motrin, Advil, Aleve, Naprosyn,  etc until 5:30 PM.     HOME CARE FOLLOWING DILATION AND CURETTAGE (D&C)    Diet  You have no restrictions on your diet.  During the evening following surgery, drink plenty of fluids and eat a light supper.    Nausea  The anesthesia may produce some nausea.  If you feel nauseated, stay in bed and try drinking fluids such as 7-Up, tea, or soup.    Discomfort  The amount of discomfort you can expect is very unpredictable.  If you have pain that cannot be controlled with Tylenol or with the prescription you may have received, you should notify your physician.  Abdominal cramping or low backache is not uncommon and should not be a cause for concern.  You will be drowsy and weak the day of surgery and possibly the following day.  Fever  A low grade fever (not over 100 F) is usual after this procedure.  Do not hesitate to notify your physician if your fever seems excessive or persists.    Activity    You may resume your normal activity.  Avoid heavy lifting for one week.    You may shower.  Do not douche, use tampons, or resume intercourse until the bleeding has ceased.    Emergency Care  Contact your physician if you have any of these problems:   1.  A fever over 100 F   2.  A large amount of bleeding or drainage   3.  Severe pain          ____________________________________________    Our hearts go out to you at this difficult time. Be assured that there is no right way to find comfort and support. It may take time to find out what works for you.  Please do not hesitate to ask for support from our nurses, social workers, or chaplains.  After you leave the hospital, if you need help finding support resources, please call [phone].  [facility] hosts a support group for anyone who has had a pregnancy loss providing a supportive place to learn and share. Couples are encouraged to attend together.     Where: [facility], [facility], [facility]  When: 1st and 3rd Thursday of each  month, 5:00pm - 6:30pm  To register, contact:    Kirby *[phone] *[email or web address]   Miya *[phone] *[email or web address]    ______________________________________________

## 2020-04-23 NOTE — OR NURSING
PNDS met, po per I&O sheet. Pt dressed, up in recliner and transported to Phase 2. AOX3-VSS-O2 sats >92% RA- Good PO intake, Denies c/o- Pt and responsible adult verbalize understanding of discharge instructions, denies questions by phone per hospital CVID protocol. . Up in W/C - transported to door for discharge to home.

## 2020-04-23 NOTE — ANESTHESIA PREPROCEDURE EVALUATION
Anesthesia Pre-Procedure Evaluation    Patient: Kalen Tracy   MRN: [id number] : 1980          Preoperative Diagnosis: Missed ab [O02.1]    Procedure(s):  SUCTION DILATION AND CURETTAGE    Past Medical History:   Diagnosis Date     Blood type, Rh positive      Carpal tunnel syndrome on right      Dr Gottlieb   + EMG     Cervical pain (neck)     C3-4 disc degeneration     Gastro-oesophageal reflux disease      GERD (gastroesophageal reflux disease)      Seasonal allergic rhinitis      Seasonal allergies      Past Surgical History:   Procedure Laterality Date     DILATION AND CURETTAGE SUCTION  2012    Procedure: DILATION AND CURETTAGE SUCTION;  Vacuum CURETTAGE;  Surgeon: Love Driver MD;  Location: [facility] OR     DILATION AND CURETTAGE, HYSTEROSCOPY DIAGNOSTIC, COMBINED         Anesthesia Evaluation     . Pt has had prior anesthetic.     No history of anesthetic complications          ROS/MED HX    ENT/Pulmonary:      (-) tobacco use and sleep apnea   Neurologic:      (-) CVA and TIA   Cardiovascular:  - neg cardiovascular ROS       METS/Exercise Tolerance:     Hematologic:  - neg hematologic  ROS       Musculoskeletal:         GI/Hepatic:     (+) GERD Asymptomatic on medication,       Renal/Genitourinary:  - ROS Renal section negative       Endo:  - neg endo ROS       Psychiatric:        (-) psychiatric history   Infectious Disease:         Malignancy:         Other:                          Physical Exam  Normal systems: dental    Airway   Mallampati: II  TM distance: >3 FB    Dental     Cardiovascular       Pulmonary    breath sounds clear to auscultation            Lab Results   Component Value Date    WBC 4.5 2019    HGB 12.9 2019    HCT 37.7 2019     2019     2014    POTASSIUM 3.9 2014    CHLORIDE 107 2014    CO2 31 2014    BUN 16 2014    CR 1.02 2014     (H) 2014    YUNIOR 8.3 (L) 2014    ALBUMIN  "4.3 05/05/2014    PROTTOTAL 8.0 05/05/2014    ALT 26 05/05/2014    AST 28 05/05/2014    ALKPHOS 56 05/05/2014    BILITOTAL 0.7 05/05/2014    TSH 2.48 12/19/2016    T4 0.97 12/19/2016    HCG pos 11/22/2005       Preop Vitals  BP Readings from Last 3 Encounters:   04/23/20 107/73   07/03/19 104/60   04/26/19 104/66    Pulse Readings from Last 3 Encounters:   04/23/20 63   07/03/19 72   04/26/19 69      Resp Readings from Last 3 Encounters:   04/23/20 18   07/17/17 18   12/26/14 16    SpO2 Readings from Last 3 Encounters:   04/23/20 99%   07/03/19 98%   04/26/19 99%      Temp Readings from Last 1 Encounters:   04/23/20 36.1  C (96.9  F) (Temporal)    Ht Readings from Last 1 Encounters:   04/23/20 1.753 m (5' 9\")      Wt Readings from Last 1 Encounters:   04/23/20 91.7 kg (202 lb 1.6 oz)    Estimated body mass index is 29.84 kg/m  as calculated from the following:    Height as of this encounter: 1.753 m (5' 9\").    Weight as of this encounter: 91.7 kg (202 lb 1.6 oz).       Anesthesia Plan      History & Physical Review  History and physical reviewed and following examination; no interval change.    ASA Status:  2 .    NPO Status:  > 8 hours    Plan for General with Intravenous induction. Maintenance will be Balanced.    PONV prophylaxis:  Ondansetron (or other 5HT-3)    The patient is not a current smoker      Postoperative Care  Postoperative pain management:  Multi-modal analgesia.      Consents  Anesthetic plan, risks, benefits and alternatives discussed with:  Patient.  Use of blood products discussed: No .   .                 Clayton Bradley MD  "

## 2020-04-23 NOTE — ANESTHESIA CARE TRANSFER NOTE
Patient: Kalen Tracy    Procedure(s):  SUCTION DILATION AND CURETTAGE    Diagnosis: Missed ab [O02.1]  Diagnosis Additional Information: No value filed.    Anesthesia Type:   General     Note:  Airway :Face Mask  Patient transferred to:PACU  Comments: Anesthesia Care Note    Patient: Kalen Tracy    Transferred to: PACU    Patient vital signs: Stable    Airway: FM    Monitors placed. VSS. PIV patent. No change in dentition. Report given to OLIVA.      Olga Dykes CRNA   4/23/2020  'Handoff Report: Identifed the Patient, Identified the Reponsible Provider, Reviewed the pertinent medical history, Discussed the surgical course, Reviewed Intra-OP anesthesia mangement and issues during anesthesia, Set expectations for post-procedure period and Allowed opportunity for questions and acknowledgement of understanding      Vitals: (Last set prior to Anesthesia Care Transfer)    CRNA VITALS  4/23/2020 1110 - 4/23/2020 1145      4/23/2020             Pulse:  63    SpO2:  100 %    Resp Rate (set):  10                Electronically Signed By: JUAN CARLOS Regalado CRNA  April 23, 2020  11:45 AM

## 2020-04-23 NOTE — PROGRESS NOTES
Reviewed procedure.  Discussed risks, benefits and alternatives including risk of bleeding, infection, injury to adjacent organs.  All questions answered.  Patient would like tissue sent for chromosomes, consent reviewed and signed.    Carmela Arnold MD  [facility]

## 2020-04-23 NOTE — BRIEF OP NOTE
[facility]    Brief Operative Note    Pre-operative diagnosis: Missed ab [O02.1]  Post-operative diagnosis Same as pre-operative diagnosis    Procedure: Procedure(s):  SUCTION DILATION AND CURETTAGE  Surgeon: Surgeon(s) and Role:     * Carmela Arnold MD - Primary  Anesthesia: General   Estimated blood loss: 30 mL  UOP: 200 mL  IVF: 700 mL  Specimens:   ID Type Source Tests Collected by Time Destination   A : PRODUCTS OF CONCEPTION Products of Conception Placenta/ Fragments/ Fetus from Miscarriage or Termination SURGICAL PATHOLOGY EXAM, CHROMOSOME SKIN PRODUCTS OF CONCEPTION Carmela Arnold MD 4/23/2020 11:15 AM      Findings:   EUA with anteverted uterus. Return of moderate tissue on suction.  Sharp curettage with gritty texture noted throughout.  Complications: None.    Carmela Arnold MD  [facility]

## 2020-04-24 LAB — COPATH REPORT: NORMAL

## 2020-05-11 DIAGNOSIS — J30.9 ALLERGIC RHINITIS, UNSPECIFIED SEASONALITY, UNSPECIFIED TRIGGER: ICD-10-CM

## 2020-05-12 RX ORDER — DIPHENHYDRAMINE HCL 25 MG
TABLET,DISINTEGRATING ORAL
Qty: 90 TABLET | Refills: 1 | Status: SHIPPED | OUTPATIENT
Start: 2020-05-12 | End: 2022-04-13

## 2020-05-12 NOTE — TELEPHONE ENCOUNTER
Prescription approved per [facility] Refill Protocol.    Ban Page RN, BSN  [facility]

## 2020-05-20 LAB — COPATH REPORT: NORMAL

## 2022-04-08 ENCOUNTER — NURSE TRIAGE (OUTPATIENT)
Dept: FAMILY MEDICINE | Facility: CLINIC | Age: 42
End: 2022-04-08
Payer: COMMERCIAL

## 2022-04-08 NOTE — TELEPHONE ENCOUNTER
Nurse Triage SBAR    Is this a 2nd Level Triage? NO    Situation: Pt concerned she has a sinus infection. Experiencing sinus pain and nose is blocked, started about 2 days ago. Pain is to the left of nose and under her eyes. She notes some nasal discharge, yellow in color. No fever.     Background: She states she has never had this before. Has not been seen in the clinic since 2019.     Assessment: see below    Protocol Recommended Disposition:   See Today Or Tomorrow In Office    Recommendation: protocol states be seen today or tomorrow. Pt does not want virtual wants to come into clinic on Monday for this. Is this OK to use same day slot early next week to schedule?     Routed to provider    Does the patient meet one of the following criteria for ADS visit consideration? 16+ years old, with an [facility] PCP     TIP  Providers, please consider if this condition is appropriate for management at one of our Acute and Diagnostic Services sites.     If patient is a good candidate, please use dotphrase <dot>triageresponse and select Refer to ADS to document.    OK to leave detailed vm.     Reason for Disposition    Patient wants to be seen    Additional Information    Negative: Sounds like a life-threatening emergency to the triager    Negative: Difficulty breathing, and not from stuffy nose (e.g., not relieved by cleaning out the nose)    Negative: SEVERE headache and has fever    Negative: Patient sounds very sick or weak to the triager    Negative: SEVERE sinus pain    Negative: Severe headache    Negative: Redness or swelling on the cheek, forehead, or around the eye    Negative: Fever > 103 F (39.4 C)    Negative: Fever > 101 F (38.3 C) and over 60 years of age    Negative: Fever > 100.0 F (37.8 C) and has diabetes mellitus or a weak immune system (e.g., HIV positive, cancer chemotherapy, organ transplant, splenectomy, chronic steroids)    Negative: Fever > 100.0 F (37.8 C) and bedridden (e.g., nursing home patient,  "stroke, chronic illness, recovering from surgery)    Negative: Fever present > 3 days (72 hours)    Negative: Fever returns after gone for over 24 hours and symptoms worse or not improved    Negative: Sinus pain (not just congestion) and fever    Negative: Earache    Answer Assessment - Initial Assessment Questions  1. LOCATION: \"Where does it hurt?\"         Left of nose and under the eyes. Worse with blowing nose.     2. ONSET: \"When did the sinus pain start?\"  (e.g., hours, days)         Started about 2 days ago.     3. SEVERITY: \"How bad is the pain?\"   (Scale 1-10; mild, moderate or severe)    - MILD (1-3): doesn't interfere with normal activities     - MODERATE (4-7): interferes with normal activities (e.g., work or school) or awakens from sleep    - SEVERE (8-10): excruciating pain and patient unable to do any normal activities           5/10, moderate.     4. RECURRENT SYMPTOM: \"Have you ever had sinus problems before?\" If so, ask: \"When was the last time?\" and \"What happened that time?\"         No.     5. NASAL CONGESTION: \"Is the nose blocked?\" If so, ask, \"Can you open it or must you breathe through the mouth?\"        Nose blocked.     6. NASAL DISCHARGE: \"Do you have discharge from your nose?\" If so ask, \"What color?\"        Does have some discharge, yellow in color more so with blowing nose.     7. FEVER: \"Do you have a fever?\" If so, ask: \"What is it, how was it measured, and when did it start?\"         No.     8. OTHER SYMPTOMS: \"Do you have any other symptoms?\" (e.g., sore throat, cough, earache, difficulty breathing)        No.     9. PREGNANCY: \"Is there any chance you are pregnant?\" \"When was your last menstrual period?\"        No, last period was 3 days ago.    Protocols used: SINUS PAIN OR CONGESTION-A-OH    SEE TODAY OR TOMORROW IN OFFICE: You need to be examined. Let me give you an appointment.      CALL BACK IF:   * Severe pain lasts longer than 2 hours after pain medicine   * Sinus pain lasts " longer than 1 day after starting treatment using nasal washes   * Sinus congestion (fullness) lasts longer than 10 days   * Fever lasts longer than 3 days   * You become worse        Patient/Caregiver understands and will follow care advice? Other, see documentation        Lori VALLE RN  [facility]

## 2022-04-11 NOTE — TELEPHONE ENCOUNTER
The patient continues to be symptomatic today, please add her to my schedule.    Rochelle Delgado MD  [facility], Shana Hidalgo

## 2022-04-11 NOTE — TELEPHONE ENCOUNTER
Patient Contact    Attempt # 1    Was call answered?  No.  Left message on voicemail with information to call triage back at [phone], option 2.     On call back: Please schedule the patient as directed below by Dr. Delgado.  Zully Aguilar RN

## 2022-04-12 NOTE — TELEPHONE ENCOUNTER
Pt is scheduled with Dr. Delgado tomorrow in same day slot. Routing as CHELSEA.     Lori VALLE RN  [facility]

## 2022-04-13 ENCOUNTER — OFFICE VISIT (OUTPATIENT)
Dept: FAMILY MEDICINE | Facility: CLINIC | Age: 42
End: 2022-04-13
Payer: COMMERCIAL

## 2022-04-13 VITALS
OXYGEN SATURATION: 98 % | WEIGHT: 202 LBS | DIASTOLIC BLOOD PRESSURE: 60 MMHG | SYSTOLIC BLOOD PRESSURE: 94 MMHG | HEART RATE: 72 BPM | BODY MASS INDEX: 29.83 KG/M2 | TEMPERATURE: 98.1 F | RESPIRATION RATE: 16 BRPM

## 2022-04-13 DIAGNOSIS — J34.89 RHINORRHEA: ICD-10-CM

## 2022-04-13 DIAGNOSIS — Z91.09 ENVIRONMENTAL ALLERGIES: Primary | ICD-10-CM

## 2022-04-13 PROCEDURE — 99213 OFFICE O/P EST LOW 20 MIN: CPT | Mod: CS | Performed by: FAMILY MEDICINE

## 2022-04-13 PROCEDURE — U0003 INFECTIOUS AGENT DETECTION BY NUCLEIC ACID (DNA OR RNA); SEVERE ACUTE RESPIRATORY SYNDROME CORONAVIRUS 2 (SARS-COV-2) (CORONAVIRUS DISEASE [COVID-19]), AMPLIFIED PROBE TECHNIQUE, MAKING USE OF HIGH THROUGHPUT TECHNOLOGIES AS DESCRIBED BY CMS-2020-01-R: HCPCS | Performed by: FAMILY MEDICINE

## 2022-04-13 PROCEDURE — U0005 INFEC AGEN DETEC AMPLI PROBE: HCPCS | Performed by: FAMILY MEDICINE

## 2022-04-13 RX ORDER — LORATADINE 10 MG/1
10 TABLET ORAL DAILY
Qty: 90 TABLET | Refills: 0 | Status: SHIPPED | OUTPATIENT
Start: 2022-04-13

## 2022-04-13 ASSESSMENT — PAIN SCALES - GENERAL: PAINLEVEL: NO PAIN (0)

## 2022-04-13 ASSESSMENT — ENCOUNTER SYMPTOMS: WHEEZING: 0

## 2022-04-13 NOTE — PROGRESS NOTES
Assessment & Plan     Environmental allergies    - loratadine (CLARITIN) 10 MG tablet; Take 1 tablet (10 mg) by mouth daily    Rhinorrhea  No signs of bacterial infection.  Patient reassured.  Covid test ordered per patient's request.  Recommending to start using loratadine.  Stay hydrated.  If any symptomatic worsening noted, instructed to notify us back.  Patient verbalized understanding and agreement  - Symptomatic; Unknown COVID-19 Virus (Coronavirus) by PCR Nose; Future  - Symptomatic; Unknown COVID-19 Virus (Coronavirus) by PCR Nose        Return in about 5 weeks (around 5/17/2022) for Annual Physical Exam.    Rochelle Delgado MD  [facility] LAISHA Higuera is a 41 year old who presents for the following health issues     Sinus Problem     Allergies    History of Present Illness       Reason for visit:  Cold  Symptom onset:  Today  Symptom intensity:  Mild  Symptom progression:  Improving  Had these symptoms before:  No    She eats 2-3 servings of fruits and vegetables daily.She consumes 0 sweetened beverage(s) daily.She exercises with enough effort to increase her heart rate 9 or less minutes per day.  She exercises with enough effort to increase her heart rate 3 or less days per week.   She is taking medications regularly.             Review of Systems   Respiratory: Negative for wheezing.             Objective    BP 94/60   Pulse 72   Temp 98.1  F (36.7  C) (Tympanic)   Resp 16   Wt 91.6 kg (202 lb)   LMP 04/06/2022   SpO2 98%   BMI 29.83 kg/m    Body mass index is 29.83 kg/m .  Physical Exam   GENERAL: healthy, alert and no distress  EYES: Eyes grossly normal to inspection, PERRL and conjunctivae and sclerae normal  HENT: ear canals and TM's normal, nose and mouth without ulcers or lesions  NECK: no adenopathy, no asymmetry, masses, or scars and thyroid normal to palpation  RESP: lungs clear to auscultation - no rales, rhonchi or wheezes  CV: regular rate and rhythm,  normal S1 S2, no S3 or S4, no murmur, click or rub, no peripheral edema and peripheral pulses strong

## 2022-04-14 LAB — SARS-COV-2 RNA RESP QL NAA+PROBE: NEGATIVE

## 2022-08-23 ENCOUNTER — OFFICE VISIT (OUTPATIENT)
Dept: FAMILY MEDICINE | Facility: CLINIC | Age: 42
End: 2022-08-23
Payer: COMMERCIAL

## 2022-08-23 VITALS
WEIGHT: 187.4 LBS | SYSTOLIC BLOOD PRESSURE: 102 MMHG | HEIGHT: 69 IN | DIASTOLIC BLOOD PRESSURE: 71 MMHG | OXYGEN SATURATION: 90 % | HEART RATE: 75 BPM | TEMPERATURE: 97.1 F | BODY MASS INDEX: 27.76 KG/M2

## 2022-08-23 DIAGNOSIS — Z00.00 ENCOUNTER FOR ROUTINE ADULT HEALTH EXAMINATION WITHOUT ABNORMAL FINDINGS: ICD-10-CM

## 2022-08-23 DIAGNOSIS — Z12.4 CERVICAL CANCER SCREENING: ICD-10-CM

## 2022-08-23 DIAGNOSIS — Z13.6 CARDIOVASCULAR SCREENING; LDL GOAL LESS THAN 100: ICD-10-CM

## 2022-08-23 DIAGNOSIS — R53.83 OTHER FATIGUE: Primary | ICD-10-CM

## 2022-08-23 DIAGNOSIS — Z23 HIGH PRIORITY FOR 2019-NCOV VACCINE: ICD-10-CM

## 2022-08-23 LAB
ANION GAP SERPL CALCULATED.3IONS-SCNC: 3 MMOL/L (ref 3–14)
BASOPHILS # BLD AUTO: 0 10E3/UL (ref 0–0.2)
BASOPHILS NFR BLD AUTO: 1 %
BUN SERPL-MCNC: 10 MG/DL (ref 7–30)
CALCIUM SERPL-MCNC: 8.7 MG/DL (ref 8.5–10.1)
CHLORIDE BLD-SCNC: 108 MMOL/L (ref 94–109)
CHOLEST SERPL-MCNC: 181 MG/DL
CO2 SERPL-SCNC: 29 MMOL/L (ref 20–32)
CREAT SERPL-MCNC: 0.77 MG/DL (ref 0.52–1.04)
EOSINOPHIL # BLD AUTO: 0.2 10E3/UL (ref 0–0.7)
EOSINOPHIL NFR BLD AUTO: 5 %
ERYTHROCYTE [DISTWIDTH] IN BLOOD BY AUTOMATED COUNT: 13.2 % (ref 10–15)
FASTING STATUS PATIENT QL REPORTED: YES
FERRITIN SERPL-MCNC: 74 NG/ML (ref 12–150)
GFR SERPL CREATININE-BSD FRML MDRD: >90 ML/MIN/1.73M2
GLUCOSE BLD-MCNC: 105 MG/DL (ref 70–99)
HCG UR QL: NEGATIVE
HCT VFR BLD AUTO: 41.4 % (ref 35–47)
HDLC SERPL-MCNC: 72 MG/DL
HGB BLD-MCNC: 13.4 G/DL (ref 11.7–15.7)
LDLC SERPL CALC-MCNC: 101 MG/DL
LYMPHOCYTES # BLD AUTO: 1.5 10E3/UL (ref 0.8–5.3)
LYMPHOCYTES NFR BLD AUTO: 39 %
MCH RBC QN AUTO: 31 PG (ref 26.5–33)
MCHC RBC AUTO-ENTMCNC: 32.4 G/DL (ref 31.5–36.5)
MCV RBC AUTO: 96 FL (ref 78–100)
MONOCYTES # BLD AUTO: 0.4 10E3/UL (ref 0–1.3)
MONOCYTES NFR BLD AUTO: 10 %
NEUTROPHILS # BLD AUTO: 1.8 10E3/UL (ref 1.6–8.3)
NEUTROPHILS NFR BLD AUTO: 45 %
NONHDLC SERPL-MCNC: 109 MG/DL
PLATELET # BLD AUTO: 242 10E3/UL (ref 150–450)
POTASSIUM BLD-SCNC: 4 MMOL/L (ref 3.4–5.3)
RBC # BLD AUTO: 4.32 10E6/UL (ref 3.8–5.2)
SODIUM SERPL-SCNC: 140 MMOL/L (ref 133–144)
TRIGL SERPL-MCNC: 42 MG/DL
TSH SERPL DL<=0.005 MIU/L-ACNC: 3.16 MU/L (ref 0.4–4)
VIT B12 SERPL-MCNC: 750 PG/ML (ref 232–1245)
WBC # BLD AUTO: 4 10E3/UL (ref 4–11)

## 2022-08-23 PROCEDURE — 87624 HPV HI-RISK TYP POOLED RSLT: CPT | Performed by: PHYSICIAN ASSISTANT

## 2022-08-23 PROCEDURE — 99396 PREV VISIT EST AGE 40-64: CPT | Mod: 25 | Performed by: PHYSICIAN ASSISTANT

## 2022-08-23 PROCEDURE — 84443 ASSAY THYROID STIM HORMONE: CPT | Performed by: PHYSICIAN ASSISTANT

## 2022-08-23 PROCEDURE — 85025 COMPLETE CBC W/AUTO DIFF WBC: CPT | Performed by: PHYSICIAN ASSISTANT

## 2022-08-23 PROCEDURE — 80048 BASIC METABOLIC PNL TOTAL CA: CPT | Performed by: PHYSICIAN ASSISTANT

## 2022-08-23 PROCEDURE — 91305 COVID-19,PF,PFIZER (12+ YRS): CPT | Performed by: PHYSICIAN ASSISTANT

## 2022-08-23 PROCEDURE — G0145 SCR C/V CYTO,THINLAYER,RESCR: HCPCS | Performed by: PHYSICIAN ASSISTANT

## 2022-08-23 PROCEDURE — 0054A COVID-19,PF,PFIZER (12+ YRS): CPT | Performed by: PHYSICIAN ASSISTANT

## 2022-08-23 PROCEDURE — 82306 VITAMIN D 25 HYDROXY: CPT | Performed by: PHYSICIAN ASSISTANT

## 2022-08-23 PROCEDURE — 81025 URINE PREGNANCY TEST: CPT | Performed by: PHYSICIAN ASSISTANT

## 2022-08-23 PROCEDURE — 82607 VITAMIN B-12: CPT | Performed by: PHYSICIAN ASSISTANT

## 2022-08-23 PROCEDURE — 82728 ASSAY OF FERRITIN: CPT | Performed by: PHYSICIAN ASSISTANT

## 2022-08-23 PROCEDURE — 80061 LIPID PANEL: CPT | Performed by: PHYSICIAN ASSISTANT

## 2022-08-23 PROCEDURE — 36415 COLL VENOUS BLD VENIPUNCTURE: CPT | Performed by: PHYSICIAN ASSISTANT

## 2022-08-23 PROCEDURE — 99213 OFFICE O/P EST LOW 20 MIN: CPT | Mod: 25 | Performed by: PHYSICIAN ASSISTANT

## 2022-08-23 ASSESSMENT — ENCOUNTER SYMPTOMS
WEAKNESS: 0
DIZZINESS: 0
NAUSEA: 0
EYE PAIN: 0
FREQUENCY: 0
PARESTHESIAS: 0
SORE THROAT: 1
HEMATOCHEZIA: 0
FEVER: 0
HEMATURIA: 0
ARTHRALGIAS: 0
DIARRHEA: 0
SHORTNESS OF BREATH: 0
HEADACHES: 1
DYSURIA: 0
MYALGIAS: 0
CONSTIPATION: 0
BREAST MASS: 0
HEARTBURN: 0
COUGH: 0
CHILLS: 0
PALPITATIONS: 0
NERVOUS/ANXIOUS: 0
ABDOMINAL PAIN: 0
JOINT SWELLING: 0

## 2022-08-23 ASSESSMENT — PAIN SCALES - GENERAL: PAINLEVEL: NO PAIN (0)

## 2022-08-23 NOTE — PROGRESS NOTES
SUBJECTIVE:   CC: Kalen Tracy is an 41 year old woman who presents for preventive health visit.       Patient has been advised of split billing requirements and indicates understanding: Yes  Healthy Habits:     Getting at least 3 servings of Calcium per day:  Yes    Bi-annual eye exam:  Yes    Dental care twice a year:  Yes    Sleep apnea or symptoms of sleep apnea:  Daytime drowsiness    Diet:  Regular (no restrictions) and Other    Frequency of exercise:  None    Taking medications regularly:  Yes    Medication side effects:  None    PHQ-2 Total Score: 0    Additional concerns today:  No      Kalen presents to the clinic for physical examination.  She has been experiencing some fatigue recently, feels like she needs to nap everyday.  No specific other symptoms.  She would like labs checked today.  She is currently taking a B12 supplement as well as a vitamin D supplement.       Today's PHQ-2 Score:   PHQ-2 ( 1999 [employer]) 8/23/2022   Q1: Little interest or pleasure in doing things 0   Q2: Feeling down, depressed or hopeless 0   PHQ-2 Score 0   PHQ-2 Total Score (12-17 Years)- Positive if 3 or more points; Administer PHQ-A if positive -   Q1: Little interest or pleasure in doing things Not at all   Q2: Feeling down, depressed or hopeless Not at all   PHQ-2 Score 0       Abuse: Current or Past (Physical, Sexual or Emotional) - No  Do you feel safe in your environment? Yes    Have you ever done Advance Care Planning? (For example, a Health Directive, POLST, or a discussion with a medical provider or your loved ones about your wishes): No, advance care planning information given to patient to review.  Patient plans to discuss their wishes with loved ones or provider.      Social History     Tobacco Use     Smoking status: Never Smoker     Smokeless tobacco: Never Used   Substance Use Topics     Alcohol use: No     Alcohol/week: 0.0 standard drinks         Alcohol Use 8/23/2022   Prescreen: >3 drinks/day or >7  drinks/week? Not Applicable       Reviewed orders with patient.  Reviewed health maintenance and updated orders accordingly - Yes  Patient Active Problem List   Diagnosis     Cervical pain (neck)     GERD (gastroesophageal reflux disease)     Seasonal allergic rhinitis     Vitamin D deficiency     Allergic rhinitis     Ascorbic acid deficiency     Vitamin B6 deficiency     Neuropathy     Vitamin B12 deficiency (non anemic)     Folate deficiency     Past Surgical History:   Procedure Laterality Date     DILATION AND CURETTAGE SUCTION  8/14/2012    Procedure: DILATION AND CURETTAGE SUCTION;  Vacuum CURETTAGE;  Surgeon: Love Driver MD;  Location: [facility]     DILATION AND CURETTAGE SUCTION N/A 4/23/2020    Procedure: SUCTION DILATION AND CURETTAGE;  Surgeon: Carmela Arnold MD;  Location: [facility]     DILATION AND CURETTAGE, HYSTEROSCOPY DIAGNOSTIC, COMBINED         Social History     Tobacco Use     Smoking status: Never Smoker     Smokeless tobacco: Never Used   Substance Use Topics     Alcohol use: No     Alcohol/week: 0.0 standard drinks     Family History   Family history unknown: Yes         Current Outpatient Medications   Medication Sig Dispense Refill     loratadine (CLARITIN) 10 MG tablet Take 1 tablet (10 mg) by mouth daily 90 tablet 0     Allergies   Allergen Reactions     Zithromax [Azithromycin] Palpitations       Breast Cancer Screening:    Breast CA Risk Assessment (FHS-7) 8/23/2022   Do you have a family history of breast, colon, or ovarian cancer? No / Unknown         Mammogram Screening: Recommended annual mammography  Pertinent mammograms are reviewed under the imaging tab.    History of abnormal Pap smear: NO - age 30-65 PAP every 5 years with negative HPV co-testing recommended  PAP / HPV Latest Ref Rng & Units 9/12/2016   PAP (Historical) Negative Negative     Reviewed and updated as needed this visit by clinical staff   Tobacco  Allergies  Meds  Problems  Med Hx   Fam Hx  Soc Hx           Reviewed and updated as needed this visit by Provider   Tobacco    Problems  Med Hx   Fam Hx  Soc Hx         Past Medical History:   Diagnosis Date     Blood type, Rh positive      Carpal tunnel syndrome on right      Dr Gottlieb   + EMG     Cervical pain (neck)     C3-4 disc degeneration     Gastro-oesophageal reflux disease      GERD (gastroesophageal reflux disease)      Seasonal allergic rhinitis      Seasonal allergies       Past Surgical History:   Procedure Laterality Date     DILATION AND CURETTAGE SUCTION  2012    Procedure: DILATION AND CURETTAGE SUCTION;  Vacuum CURETTAGE;  Surgeon: Love Driver MD;  Location: [facility]     DILATION AND CURETTAGE SUCTION N/A 2020    Procedure: SUCTION DILATION AND CURETTAGE;  Surgeon: Carmela Arnold MD;  Location: [facility]     DILATION AND CURETTAGE, HYSTEROSCOPY DIAGNOSTIC, COMBINED       OB History    Para Term  AB Living   5 4 4 0 1 4   SAB IAB Ectopic Multiple Live Births   1 0 0 0 1      # Outcome Date GA Lbr Mike/2nd Weight Sex Delivery Anes PTL Lv   5 Term 11 41w4d 09:05 / 00:04 3.912 kg (8 lb 10 oz) M Vag-Spont Local N       Name: JOCELIN POON FIDE      Apgar1: 8  Apgar5: 9   4 Term 04 40w0d 01:45 3.26 kg (7 lb 3 oz) M    DODIE      Birth Comments: Fast labor   3 SAB            2 Term            1 Term                Review of Systems   Constitutional: Negative for chills and fever.   HENT: Positive for sore throat. Negative for congestion, ear pain and hearing loss.    Eyes: Negative for pain and visual disturbance.   Respiratory: Negative for cough and shortness of breath.    Cardiovascular: Negative for chest pain, palpitations and peripheral edema.   Gastrointestinal: Negative for abdominal pain, constipation, diarrhea, heartburn, hematochezia and nausea.   Breasts:  Negative for tenderness, breast mass and discharge.   Genitourinary: Negative for dysuria, frequency, genital sores, hematuria, pelvic pain,  "urgency, vaginal bleeding and vaginal discharge.   Musculoskeletal: Negative for arthralgias, joint swelling and myalgias.   Skin: Negative for rash.   Neurological: Positive for headaches. Negative for dizziness, weakness and paresthesias.   Psychiatric/Behavioral: Negative for mood changes. The patient is not nervous/anxious.           OBJECTIVE:   /71   Pulse 75   Temp 97.1  F (36.2  C) (Temporal)   Ht 1.753 m (5' 9\")   Wt 85 kg (187 lb 6.4 oz)   SpO2 90%   BMI 27.67 kg/m    Physical Exam  GENERAL: healthy, alert and no distress  EYES: Eyes grossly normal to inspection, PERRL and conjunctivae and sclerae normal  HENT: ear canals and TM's normal, nose and mouth without ulcers or lesions  NECK: no adenopathy, no asymmetry, masses, or scars and thyroid normal to palpation  RESP: lungs clear to auscultation - no rales, rhonchi or wheezes  BREAST: normal without masses, tenderness or nipple discharge and no palpable axillary masses or adenopathy  CV: regular rate and rhythm, normal S1 S2, no S3 or S4, no murmur, click or rub, no peripheral edema and peripheral pulses strong  ABDOMEN: soft, nontender, no hepatosplenomegaly, no masses and bowel sounds normal   (female): normal female external genitalia, normal urethral meatus, vaginal mucosa, normal cervix/adnexa/uterus without masses or discharge  MS: no gross musculoskeletal defects noted, no edema  SKIN: no suspicious lesions or rashes  NEURO: Normal strength and tone, mentation intact and speech normal  PSYCH: mentation appears normal, affect normal/bright        ASSESSMENT/PLAN:       1. Encounter for routine adult health examination without abnormal findings    2. Other fatigue  - CBC with platelets and differential; Future  - Basic metabolic panel  (Ca, Cl, CO2, Creat, Gluc, K, Na, BUN); Future  - Ferritin; Future  - Vitamin D Deficiency; Future  - Vitamin B12; Future  - TSH with free T4 reflex; Future  - HCG Qual, Urine ([id number]); Future  - HCG " "Qual, Urine ([id number])  - TSH with free T4 reflex  - Vitamin B12  - Vitamin D Deficiency  - Ferritin  - Basic metabolic panel  (Ca, Cl, CO2, Creat, Gluc, K, Na, BUN)  - CBC with platelets and differential    3. Cervical cancer screening  - Pap screen with HPV - recommended age 30 - 65 years    4. CARDIOVASCULAR SCREENING; LDL GOAL LESS THAN 100    - Lipid Profile (Chol, Trig, HDL, LDL calc); Future  - Lipid Profile (Chol, Trig, HDL, LDL calc)    5. High priority for 2019-nCoV vaccine  - COVID-19,PF,PFIZER (12+ Yrs GRAY LABEL)          COUNSELING:  Reviewed preventive health counseling, as reflected in patient instructions       Regular exercise       Healthy diet/nutrition    Estimated body mass index is 27.67 kg/m  as calculated from the following:    Height as of this encounter: 1.753 m (5' 9\").    Weight as of this encounter: 85 kg (187 lb 6.4 oz).        She reports that she has never smoked. She has never used smokeless tobacco.      Counseling Resources:  ATP IV Guidelines  Pooled Cohorts Equation Calculator  Breast Cancer Risk Calculator  BRCA-Related Cancer Risk Assessment: FHS-7 Tool  FRAX Risk Assessment  ICSI Preventive Guidelines  Dietary Guidelines for Americans, 2010  USDA's MyPlate  ASA Prophylaxis  Lung CA Screening    CARL Alvarado [facility]  "

## 2022-08-24 LAB — DEPRECATED CALCIDIOL+CALCIFEROL SERPL-MC: 37 UG/L (ref 20–75)

## 2022-08-25 LAB
BKR LAB AP GYN ADEQUACY: NORMAL
BKR LAB AP GYN INTERPRETATION: NORMAL
BKR LAB AP HPV REFLEX: NORMAL
BKR LAB AP PREVIOUS ABNORMAL: NORMAL
PATH REPORT.COMMENTS IMP SPEC: NORMAL
PATH REPORT.COMMENTS IMP SPEC: NORMAL
PATH REPORT.RELEVANT HX SPEC: NORMAL

## 2022-08-29 LAB
HUMAN PAPILLOMA VIRUS 16 DNA: NEGATIVE
HUMAN PAPILLOMA VIRUS 18 DNA: NEGATIVE
HUMAN PAPILLOMA VIRUS FINAL DIAGNOSIS: NORMAL
HUMAN PAPILLOMA VIRUS OTHER HR: NEGATIVE

## 2022-08-29 NOTE — RESULT ENCOUNTER NOTE
Kalen-  Here are your recent results.     Your labs are normal with the exception of mildly elevated LDL cholesterol.  Eating a healthy diet and getting plenty of exercise will improve this.  We should recheck your labs in 1 year    Shon Castaneda PA-C

## 2023-03-23 ENCOUNTER — OFFICE VISIT (OUTPATIENT)
Dept: FAMILY MEDICINE | Facility: CLINIC | Age: 43
End: 2023-03-23
Payer: COMMERCIAL

## 2023-03-23 ENCOUNTER — ANCILLARY PROCEDURE (OUTPATIENT)
Dept: GENERAL RADIOLOGY | Facility: CLINIC | Age: 43
End: 2023-03-23
Attending: PHYSICIAN ASSISTANT
Payer: COMMERCIAL

## 2023-03-23 VITALS
HEIGHT: 69 IN | WEIGHT: 203 LBS | TEMPERATURE: 97.6 F | HEART RATE: 69 BPM | DIASTOLIC BLOOD PRESSURE: 68 MMHG | SYSTOLIC BLOOD PRESSURE: 104 MMHG | OXYGEN SATURATION: 100 % | RESPIRATION RATE: 18 BRPM | BODY MASS INDEX: 30.07 KG/M2

## 2023-03-23 DIAGNOSIS — M25.512 ACUTE PAIN OF LEFT SHOULDER: Primary | ICD-10-CM

## 2023-03-23 DIAGNOSIS — M25.512 ACUTE PAIN OF LEFT SHOULDER: ICD-10-CM

## 2023-03-23 DIAGNOSIS — Z32.00 PREGNANCY EXAMINATION OR TEST, PREGNANCY UNCONFIRMED: ICD-10-CM

## 2023-03-23 LAB — HCG UR QL: NEGATIVE

## 2023-03-23 PROCEDURE — 99213 OFFICE O/P EST LOW 20 MIN: CPT | Performed by: PHYSICIAN ASSISTANT

## 2023-03-23 PROCEDURE — 81025 URINE PREGNANCY TEST: CPT | Performed by: PHYSICIAN ASSISTANT

## 2023-03-23 PROCEDURE — 73030 X-RAY EXAM OF SHOULDER: CPT | Mod: TC | Performed by: RADIOLOGY

## 2023-03-23 RX ORDER — METHYLPREDNISOLONE 4 MG
TABLET, DOSE PACK ORAL
Qty: 21 TABLET | Refills: 0 | Status: SHIPPED | OUTPATIENT
Start: 2023-03-23 | End: 2023-07-27

## 2023-03-23 ASSESSMENT — PAIN SCALES - GENERAL: PAINLEVEL: EXTREME PAIN (9)

## 2023-03-23 NOTE — PROGRESS NOTES
Assessment & Plan     Acute pain of left shoulder  X-ray left shoulder today.  Urine pregnancy negative.  Heat.  Tylenol.  Medrol Dosepak.  Discussed possibility for physical therapy or orthopedics.  Willing to try physical therapy.  Suspect frozen shoulder.  Discussed signs and symptoms that warrant urgent/emergent reevaluation.    - XR Shoulder Left 2 Views  - methylPREDNISolone (MEDROL DOSEPAK) 4 MG tablet therapy pack  Dispense: 21 tablet; Refill: 0  - Physical Therapy Referral    Pregnancy examination or test, pregnancy unconfirmed  - HCG qualitative urine      20 minutes spent on the date of the encounter doing chart review, review of test results, interpretation of tests, patient visit and documentation          Return in about 2 months (around 5/23/2023), or if symptoms worsen or fail to improve.    The likelihood of other entities in the differential is insufficient to justify any further testing for them at this time. This was explained to the patient. The patient was advised that persistent or worsening symptoms would require further evaluation. Patient advised to call the office and if unable to reach to go to the emergency room if they develop any new or worsening symptoms. Expressed understanding and agreement with above stated plan.     Cricket Cazares PA-C  [facility]    Subjective   Kalencyndi Tracy is a 42 year old female presenting for the following health issues:  Patient presents with:  Shoulder    Here for evaluation of a 2-month history of left atraumatic shoulder pain. No inciting injury. No previous injuries or surgeries to affected extremity. Described as sharp. Tried massage, muscle relaxer without relief.  No numbness, tingling. No neck pain. Afebrile.       Review of Systems   Constitutional, HEENT, cardiovascular, pulmonary, GI, , musculoskeletal, neuro, skin, endocrine and psych systems are negative, except as otherwise noted.      Objective    /68 (BP  "Location: Right arm, Patient Position: Chair, Cuff Size: Adult Large)   Pulse 69   Temp 97.6  F (36.4  C) (Temporal)   Resp 18   Ht 1.753 m (5' 9\")   Wt 92.1 kg (203 lb)   LMP 02/14/2023   SpO2 100%   Breastfeeding No   BMI 29.98 kg/m    5' 9\"  203 lbs 0 oz    Allergies   Allergen Reactions     Zithromax [Azithromycin] Palpitations     Current Outpatient Medications   Medication Sig Dispense Refill     loratadine (CLARITIN) 10 MG tablet Take 1 tablet (10 mg) by mouth daily 90 tablet 0     methylPREDNISolone (MEDROL DOSEPAK) 4 MG tablet therapy pack Follow Package Directions 21 tablet 0     Past Medical History:   Diagnosis Date     Blood type, Rh positive      Carpal tunnel syndrome on right 2011     Dr Gottlieb   + EMG     Cervical pain (neck)     C3-4 disc degeneration     Gastro-oesophageal reflux disease      GERD (gastroesophageal reflux disease)      Seasonal allergic rhinitis      Seasonal allergies      Past Surgical History:   Procedure Laterality Date     DILATION AND CURETTAGE SUCTION  8/14/2012    Procedure: DILATION AND CURETTAGE SUCTION;  Vacuum CURETTAGE;  Surgeon: Love Driver MD;  Location: [facility]     DILATION AND CURETTAGE SUCTION N/A 4/23/2020    Procedure: SUCTION DILATION AND CURETTAGE;  Surgeon: Carmela Arnold MD;  Location: [facility] OR     DILATION AND CURETTAGE, HYSTEROSCOPY DIAGNOSTIC, COMBINED         Physical Exam   GENERAL: healthy, alert and no distress  EYES: Eyes grossly normal to inspection, PERRL and conjunctivae and sclerae normal  HENT: ear canals and TM's normal, nose and mouth without ulcers or lesions  NECK: no adenopathy, no asymmetry, masses, or scars and thyroid normal to palpation  RESP: lungs clear to auscultation - no rales, rhonchi or wheezes  CV: regular rate and rhythm, normal S1 S2, no S3 or S4, no murmur, click or rub, no peripheral edema and peripheral pulses strong  MS: Atraumatic left shoulder.  Pain with left shoulder abduction/external rotation.  Pain " with overhead movements.  Strong distal pulses.  Sensation intact.  No pain at wrist or elbow joints.  No neck pain.  SKIN: no suspicious lesions or rashes  NEURO: Normal strength and tone, mentation intact and speech normal  PSYCH: mentation appears normal, affect normal/bright        Answers for HPI/ROS submitted by the patient on 3/23/2023  Your back pain is: new  What do you think is the original cause of your back pain?: not sure  When did you first notice your back pain? : more than 1 month ago  How would you describe your back pain? : other  How often do you feel your back pain? : comes and goes  Where is your back pain located? : left lower back  Where does your back pain spread? : left shoulder  Since you noticed your back pain, how has it changed? : no longer a problem  Does your back pain interfere with your job?: No  On a scale of 1-10 (10 being the worst), how strong is your back pain?: 7  What makes your back pain worse? : twisting  Acupuncture:: not tried  Acetaminophen: not tried  Activity or Exercise: not tried  Chiropractor: not tried  Cold: not tried  Heat: not tried  Massage: helpful  Muscle relaxants : helpful  NSAIDS (Ibuprofen, Naproxen) : not tried  Opioids: not tried  Physical Therapy: not tried  Rest: not tried  Steroid Injection: not tried  Stretching : not tried  Surgery: not tried  TENS Unit: not tried  Topical pain relievers : not tried  Do you see any other healthcare providers for your back pain? : None  How many servings of fruits and vegetables do you eat daily?: 4 or more  On average, how many sweetened beverages do you drink each day (Examples: soda, juice, sweet tea, etc.  Do NOT count diet or artificially sweetened beverages)?: 1  How many minutes a day do you exercise enough to make your heart beat faster?: 9 or less  How many days a week do you exercise enough to make your heart beat faster?: 3 or less  How many days per week do you miss taking your medication?: 2  What is the  reason for your visit today?: shoulder pain  When did your symptoms begin?: Today  How would you describe these symptoms?: Moderate  Are your symptoms:: Staying the same  Have you had these symptoms before?: No  Is there anything that makes you feel worse?: when i move my hand  Is there anything that makes you feel better?: when i but right posion

## 2023-03-24 NOTE — RESULT ENCOUNTER NOTE
Can we call and let her know that her shoulder x-ray is negative.  No fracture or bone lesions.  Normal joint alignment.    Start Medrol Dosepak.  Tylenol.  Heat.  Encouraged her to get physical therapy started!

## 2023-03-28 ENCOUNTER — TELEPHONE (OUTPATIENT)
Dept: FAMILY MEDICINE | Facility: CLINIC | Age: 43
End: 2023-03-28
Payer: COMMERCIAL

## 2023-03-28 NOTE — TELEPHONE ENCOUNTER
Writer called patient and reviewed provider's result note with patient.    Patient expressed verbal understanding and is agreeable, appreciative of call back.    No further questions or concerns at this time.    Signing encounter.    Filipe Norris RN  [facility]

## 2023-03-28 NOTE — TELEPHONE ENCOUNTER
----- Message from Cricket Cazares PA-C sent at 3/24/2023  7:20 AM CDT -----  Can we call and let her know that her shoulder x-ray is negative.  No fracture or bone lesions.  Normal joint alignment.    Start Medrol Dosepak.  Tylenol.  Heat.  Encouraged her to get physical therapy started!

## 2023-04-04 ENCOUNTER — THERAPY VISIT (OUTPATIENT)
Dept: PHYSICAL THERAPY | Facility: CLINIC | Age: 43
End: 2023-04-04
Attending: PHYSICIAN ASSISTANT
Payer: COMMERCIAL

## 2023-04-04 DIAGNOSIS — M25.512 ACUTE PAIN OF LEFT SHOULDER: ICD-10-CM

## 2023-04-04 PROCEDURE — 97161 PT EVAL LOW COMPLEX 20 MIN: CPT | Mod: GP | Performed by: PHYSICAL THERAPIST

## 2023-04-04 PROCEDURE — 97110 THERAPEUTIC EXERCISES: CPT | Mod: GP | Performed by: PHYSICAL THERAPIST

## 2023-04-04 NOTE — PROGRESS NOTES
Physical Therapy Initial Evaluation  Subjective:  The history is provided by the patient. No  was used.   Patient Health History  Kalen Tracy being seen for shoulder pain.     Problem began: 2/1/2023.   Problem occurred: insiduous   Pain is reported as 6/10 on pain scale.    Pertinent medical history includes: none.   Red flags:  None as reported by patient.  Medical allergies: none.   Surgeries include:  None.    Current medications:  Pain medication and muscle relaxants.    Current occupation is none .                     Therapist Generated HPI Evaluation         Type of problem:  Left shoulder.    This is a new condition.  Condition occurred with:  Unknown cause.  Where condition occurred: for unknown reasons.  Patient reports pain:  Anterior, posterior, upper arm and lateral.  Pain is described as sharp and is intermittent.    Since onset symptoms are gradually improving.  Associated symptoms:  Loss of motion/stiffness. Symptoms are exacerbated by lying on extremity, using arm overhead, using arm behind back and using arm at shoulder level  and relieved by analgesics (massage).  Special tests included:  X-ray.    Barriers include:  None as reported by patient.                        Objective:  System                   Shoulder Evaluation:  ROM:  AROM:    Flexion:  Left:  55 (ERP)    Right:  160    Abduction:  Left: 68 (ERP)   Right:  170      External Rotation:  Left:  10 (ERP)    Right:  54          Flexion/External Rotation:  Left:  Unable to do    Right:  T2  Extension/Internal Rotation:  Left:  S1 (ERP)    Right:  T9    PROM:    Flexion:  Left:  120          Abduction:  Left:  120          External Rotation:  Left:  10 (ERP)                        Strength:  : unable to assess MMT L shoulder flex or abd due to lack of ROM.          Internal Rotation:  Left:5/5      Pain:+    Right: 5/5     Pain:  External Rotation:   Left:5/5      Pain:+         Elbow Flexion:  Left:5/5     Pain:     Right:5/5     Pain:                                             General     ROS    Assessment/Plan:    Patient is a 42 year old female with left side shoulder complaints.    Patient has the following significant findings with corresponding treatment plan.                Diagnosis 1:  L shoulder pain secondary to contractile dysfunction  Pain -  hot/cold therapy  Decreased ROM/flexibility - manual therapy, therapeutic exercise and home program  Decreased strength - therapeutic exercise, therapeutic activities and home program  Impaired muscle performance - neuro re-education and home program  Decreased function - therapeutic activities and home program    Therapy Evaluation Codes:   1) History comprised of:   Personal factors that impact the plan of care:      None.    Comorbidity factors that impact the plan of care are:      None.     Medications impacting care: Pain.  2) Examination of Body Systems comprised of:   Body structures and functions that impact the plan of care:      Shoulder.   Activity limitations that impact the plan of care are:      Bathing, Cooking, Dressing, Sleeping and reaching.  3) Clinical presentation characteristics are:   Evolving/Changing.  4) Decision-Making    Low complexity using standardized patient assessment instrument and/or measureable assessment of functional outcome.  Cumulative Therapy Evaluation is: Low complexity.    Previous and current functional limitations:  (See Goal Flow Sheet for this information)    Short term and Long term goals: (See Goal Flow Sheet for this information)     Communication ability:  Patient appears to be able to clearly communicate and understand verbal and written communication and follow directions correctly.  Treatment Explanation - The following has been discussed with the patient:   RX ordered/plan of care  Anticipated outcomes  Possible risks and side effects  This patient would benefit from PT intervention to resume normal activities.   Rehab  potential is good.    Frequency:  1 X week, once daily  Duration:  for 4 weeks tapering to 2 X a month over 2 months  Discharge Plan:  Achieve all LTG.  Independent in home treatment program.  Reach maximal therapeutic benefit.    Please refer to the daily flowsheet for treatment today, total treatment time and time spent performing 1:1 timed codes.

## 2023-04-04 NOTE — PROGRESS NOTES
[facility]    OUTPATIENT Physical Therapy ORTHOPEDIC EVALUATION  PLAN OF TREATMENT FOR OUTPATIENT REHABILITATION  (COMPLETE FOR INITIAL CLAIMS ONLY)  Patient's Last Name, First Name, M.I.  YOB: 1980  Kalen Tracy    Provider s Name:  [facility]   Medical Record No.  [id number]   Start of Care Date:  04/04/23   Onset Date:   02/01/23   Treatment Diagnosis:  L shoulder pain secondary to contractile dysfunction Medical Diagnosis:  Acute pain of left shoulder       Goals:     04/04/23 0500   Body Part   Goals listed below are for L shoulder contractile dysfunction   Goal #1   Goal #1 reaching   Previous Functional Level No restrictions   Current Functional Level Cannot reach ;overhead;out to the side;behind back   Performance level L shoulder AROM flex 55, abd 68, ER 10, Ext/IR to S1, Flex/ER unable to do   STG Target Performance Reach ;overhead;out to the side   Performance level L shoulder AROM flex 80-90, abd 80-90   Rationale for independent personal hygiene;for dressing;for bathing;for meal preparation   Due date 04/25/23   LTG Target Performance Reach;overhead;out to the side;behind back   Performance Level L shoulder AROM flex 120-130, abd 120-130, ER 40-50, Ext/IR to L1, Flex/ER to C7   Rationale for dressing;for independent personal hygiene;for bathing;for meal preparation   Due date 05/30/23       Therapy Frequency:  1x/week  Predicted Duration of Therapy Intervention:  4 weeks, tapering to 2x/month for 2 months    Doris Lowry PT                 I CERTIFY THE NEED FOR THESE SERVICES FURNISHED UNDER        THIS PLAN OF TREATMENT AND WHILE UNDER MY CARE     (Physician attestation of this document indicates review and certification of the therapy plan).                     Certification Date From:  04/04/23   Certification Date To:   07/02/23    Referring Provider:  Cricket Cazares    Initial Assessment        See Epic Evaluation SOC Date: 04/04/23

## 2023-04-10 ENCOUNTER — THERAPY VISIT (OUTPATIENT)
Dept: PHYSICAL THERAPY | Facility: CLINIC | Age: 43
End: 2023-04-10
Attending: PHYSICIAN ASSISTANT
Payer: COMMERCIAL

## 2023-04-10 DIAGNOSIS — M25.512 ACUTE PAIN OF LEFT SHOULDER: Primary | ICD-10-CM

## 2023-04-10 PROCEDURE — 97112 NEUROMUSCULAR REEDUCATION: CPT | Mod: GP | Performed by: PHYSICAL THERAPIST

## 2023-04-10 PROCEDURE — 97110 THERAPEUTIC EXERCISES: CPT | Mod: GP | Performed by: PHYSICAL THERAPIST

## 2023-06-01 NOTE — PROGRESS NOTES
Kalen NOHEMI Demarcus was seen 2 times for evaluation and treatment.  Patient did not return for further treatment and current status is unknown.  Due to short treatment duration, no objective or functional changes were made.  Please see goal flow sheet from episode noted date below and initial evaluation for further information.    Linked Episodes   Type: Episode: Status: Noted: Resolved: Last update: Updated by:   PHYSICAL THERAPY [id number] Active 4/4/2023  4/10/2023 12:07 PM Doris Lowry PT      Comments:

## 2023-07-27 ENCOUNTER — OFFICE VISIT (OUTPATIENT)
Dept: FAMILY MEDICINE | Facility: CLINIC | Age: 43
End: 2023-07-27
Payer: COMMERCIAL

## 2023-07-27 VITALS
OXYGEN SATURATION: 98 % | BODY MASS INDEX: 29.45 KG/M2 | RESPIRATION RATE: 16 BRPM | DIASTOLIC BLOOD PRESSURE: 78 MMHG | SYSTOLIC BLOOD PRESSURE: 106 MMHG | HEART RATE: 70 BPM | WEIGHT: 199.4 LBS | TEMPERATURE: 98.4 F

## 2023-07-27 DIAGNOSIS — Z11.59 NEED FOR HEPATITIS C SCREENING TEST: Primary | ICD-10-CM

## 2023-07-27 DIAGNOSIS — R53.83 OTHER FATIGUE: ICD-10-CM

## 2023-07-27 DIAGNOSIS — E55.9 VITAMIN D DEFICIENCY: ICD-10-CM

## 2023-07-27 DIAGNOSIS — M25.512 LEFT SHOULDER PAIN, UNSPECIFIED CHRONICITY: ICD-10-CM

## 2023-07-27 DIAGNOSIS — E53.8 VITAMIN B12 DEFICIENCY (NON ANEMIC): ICD-10-CM

## 2023-07-27 LAB
ALBUMIN SERPL BCG-MCNC: 4.2 G/DL (ref 3.5–5.2)
ALP SERPL-CCNC: 63 U/L (ref 35–104)
ALT SERPL W P-5'-P-CCNC: 14 U/L (ref 0–50)
ANION GAP SERPL CALCULATED.3IONS-SCNC: 10 MMOL/L (ref 7–15)
AST SERPL W P-5'-P-CCNC: 27 U/L (ref 0–45)
BILIRUB SERPL-MCNC: 0.6 MG/DL
BUN SERPL-MCNC: 12.7 MG/DL (ref 6–20)
CALCIUM SERPL-MCNC: 9.1 MG/DL (ref 8.6–10)
CHLORIDE SERPL-SCNC: 104 MMOL/L (ref 98–107)
CREAT SERPL-MCNC: 0.8 MG/DL (ref 0.51–0.95)
DEPRECATED CALCIDIOL+CALCIFEROL SERPL-MC: 34 UG/L (ref 20–75)
DEPRECATED HCO3 PLAS-SCNC: 25 MMOL/L (ref 22–29)
ERYTHROCYTE [DISTWIDTH] IN BLOOD BY AUTOMATED COUNT: 12.4 % (ref 10–15)
GFR SERPL CREATININE-BSD FRML MDRD: >90 ML/MIN/1.73M2
GLUCOSE SERPL-MCNC: 109 MG/DL (ref 70–99)
HCT VFR BLD AUTO: 41 % (ref 35–47)
HCV AB SERPL QL IA: NONREACTIVE
HGB BLD-MCNC: 13.5 G/DL (ref 11.7–15.7)
MCH RBC QN AUTO: 31.2 PG (ref 26.5–33)
MCHC RBC AUTO-ENTMCNC: 32.9 G/DL (ref 31.5–36.5)
MCV RBC AUTO: 95 FL (ref 78–100)
PLATELET # BLD AUTO: 245 10E3/UL (ref 150–450)
POTASSIUM SERPL-SCNC: 4 MMOL/L (ref 3.4–5.3)
PROT SERPL-MCNC: 7.5 G/DL (ref 6.4–8.3)
RBC # BLD AUTO: 4.33 10E6/UL (ref 3.8–5.2)
SODIUM SERPL-SCNC: 139 MMOL/L (ref 136–145)
TSH SERPL DL<=0.005 MIU/L-ACNC: 3.29 UIU/ML (ref 0.3–4.2)
VIT B12 SERPL-MCNC: 991 PG/ML (ref 232–1245)
WBC # BLD AUTO: 3.9 10E3/UL (ref 4–11)

## 2023-07-27 PROCEDURE — 84443 ASSAY THYROID STIM HORMONE: CPT | Performed by: FAMILY MEDICINE

## 2023-07-27 PROCEDURE — 99214 OFFICE O/P EST MOD 30 MIN: CPT | Performed by: FAMILY MEDICINE

## 2023-07-27 PROCEDURE — 82607 VITAMIN B-12: CPT | Performed by: FAMILY MEDICINE

## 2023-07-27 PROCEDURE — 86803 HEPATITIS C AB TEST: CPT | Performed by: FAMILY MEDICINE

## 2023-07-27 PROCEDURE — 85027 COMPLETE CBC AUTOMATED: CPT | Performed by: FAMILY MEDICINE

## 2023-07-27 PROCEDURE — 80053 COMPREHEN METABOLIC PANEL: CPT | Performed by: FAMILY MEDICINE

## 2023-07-27 PROCEDURE — 82306 VITAMIN D 25 HYDROXY: CPT | Performed by: FAMILY MEDICINE

## 2023-07-27 PROCEDURE — 36415 COLL VENOUS BLD VENIPUNCTURE: CPT | Performed by: FAMILY MEDICINE

## 2023-07-27 RX ORDER — PYRIDOXINE HCL (VITAMIN B6) 25 MG
25 TABLET ORAL DAILY
COMMUNITY

## 2023-07-27 RX ORDER — MAGNESIUM 200 MG
TABLET ORAL DAILY
COMMUNITY

## 2023-07-27 ASSESSMENT — PAIN SCALES - GENERAL: PAINLEVEL: EXTREME PAIN (9)

## 2023-07-27 NOTE — LETTER
July 31, 2023      Kalen NOHEMI Demarcus  [address]  [place] [address]        Dear ,        I have reviewed your recent labs. Here are the results:     -Normal red blood cell (hgb) levels, normal white blood cell count and normal platelet levels.   -TSH (thyroid stimulating hormone) level is normal which indicates normal thyroid function.   -Vitamin D level is normal and getting 1000 IU daily in your diet or supplements is recommended.   -Vit 12 is normal .   -liver , kidney functions are normal.      Resulted Orders   Hepatitis C Screen Reflex to HCV RNA Quant and Genotype   Result Value Ref Range    Hepatitis C Antibody Nonreactive Nonreactive    Narrative    Assay performance characteristics have not been established for newborns, infants, and children.   TSH with free T4 reflex   Result Value Ref Range    TSH 3.29 0.30 - 4.20 uIU/mL   Comprehensive metabolic panel   Result Value Ref Range    Sodium 139 136 - 145 mmol/L    Potassium 4.0 3.4 - 5.3 mmol/L    Chloride 104 98 - 107 mmol/L    Carbon Dioxide (CO2) 25 22 - 29 mmol/L    Anion Gap 10 7 - 15 mmol/L    Urea Nitrogen 12.7 6.0 - 20.0 mg/dL    Creatinine 0.80 0.51 - 0.95 mg/dL    Calcium 9.1 8.6 - 10.0 mg/dL    Glucose 109 (H) 70 - 99 mg/dL    Alkaline Phosphatase 63 35 - 104 U/L    AST 27 0 - 45 U/L      Comment:      Reference intervals for this test were updated on 6/12/2023 to more accurately reflect our healthy population. There may be differences in the flagging of prior results with similar values performed with this method. Interpretation of those prior results can be made in the context of the updated reference intervals.    ALT 14 0 - 50 U/L      Comment:      Reference intervals for this test were updated on 6/12/2023 to more accurately reflect our healthy population. There may be differences in the flagging of prior results with similar values performed with this method. Interpretation of those prior results can be made in the  context of the updated reference intervals.      Protein Total 7.5 6.4 - 8.3 g/dL    Albumin 4.2 3.5 - 5.2 g/dL    Bilirubin Total 0.6 <=1.2 mg/dL    GFR Estimate >90 >60 mL/min/1.73m2   CBC with Platelets   Result Value Ref Range    WBC Count 3.9 (L) 4.0 - 11.0 10e3/uL    RBC Count 4.33 3.80 - 5.20 10e6/uL    Hemoglobin 13.5 11.7 - 15.7 g/dL    Hematocrit 41.0 35.0 - 47.0 %    MCV 95 78 - 100 fL    MCH 31.2 26.5 - 33.0 pg    MCHC 32.9 31.5 - 36.5 g/dL    RDW 12.4 10.0 - 15.0 %    Platelet Count 245 150 - 450 10e3/uL   Vitamin D Deficiency   Result Value Ref Range    Vitamin D, Total (25-Hydroxy) 34 20 - 75 ug/L    Narrative    Season, race, dietary intake, and treatment affect the concentration of 25-hydroxy-Vitamin D. Values may decrease during winter months and increase during summer months. Values 20-29 ug/L may indicate Vitamin D insufficiency and values <20 ug/L may indicate Vitamin D deficiency.    Vitamin D determination is routinely performed by an immunoassay specific for 25 hydroxyvitamin D3.  If an individual is on vitamin D2(ergocalciferol) supplementation, please specify 25 OH vitamin D2 and D3 level determination by LCMSMS test [id number].     Vitamin B12   Result Value Ref Range    Vitamin B12 991 232 - 1,245 pg/mL       If you have any questions or concerns, please call the clinic at the number listed above.       Sincerely,      Emil Flood MD

## 2023-07-27 NOTE — PROGRESS NOTES
"  Assessment & Plan     Need for hepatitis C screening test    - Hepatitis C Screen Reflex to HCV RNA Quant and Genotype; Future  - Hepatitis C Screen Reflex to HCV RNA Quant and Genotype    Vitamin D deficiency    - Vitamin D Deficiency; Future  - Vitamin D Deficiency    Vitamin B12 deficiency (non anemic)    - Vitamin B12; Future  - Vitamin B12    Other fatigue    - TSH with free T4 reflex; Future  - Comprehensive metabolic panel; Future  - CBC with Platelets; Future  - Vitamin D Deficiency; Future  - TSH with free T4 reflex  - Comprehensive metabolic panel  - CBC with Platelets  - Vitamin D Deficiency    Left shoulder pain, unspecified chronicity  Ongoing left shoulder pain with some limitation in overhead activities and some internal/external rotation.  She has finished physical therapy and x-rays in the past without any significant relief.  Denies any strength decrease.  Suggested further evaluation from MRI.  - MR Shoulder Left w/o Contrast; Future  56}     BMI:   Estimated body mass index is 29.45 kg/m  as calculated from the following:    Height as of 3/23/23: 1.753 m (5' 9\").    Weight as of this encounter: 90.4 kg (199 lb 6.4 oz).     Emil Flood MD  [facility] is a 42 year old, presenting for the following health issues:  Shoulder Pain (Worsening shoulder pain. ), Refill Request (Vitamins. ), and Patient Request (Labs for vitamin efficiently. )  Came today to discuss shoulder pain she is having for the last almost 5 6 months.  She has done physical therapy without any significant relief.  She had x-ray done in the past which was noncontributory.  She can lift her arm up to 30  And then she has some discomfort.  Also like to get some blood work and she is feeling somewhat fatigued.  She is currently taking vitamin D and B12.      7/27/2023     9:22 AM   Additional Questions   Roomed by Stephanie TESFAYE       Shoulder Pain    History of Present Illness       Reason " for visit:  Shulder pain    She eats 0-1 servings of fruits and vegetables daily.She consumes 1 sweetened beverage(s) daily.She exercises with enough effort to increase her heart rate 9 or less minutes per day.  She exercises with enough effort to increase her heart rate 3 or less days per week. She is missing 1 dose(s) of medications per week.  She is not taking prescribed medications regularly due to remembering to take and other.           Review of Systems   Constitutional, HEENT, cardiovascular, pulmonary, gi and gu systems are negative, except as otherwise noted.      Objective    /78   Pulse 70   Temp 98.4  F (36.9  C) (Tympanic)   Resp 16   Wt 90.4 kg (199 lb 6.4 oz)   LMP 06/22/2023 (Within Weeks)   SpO2 98%   BMI 29.45 kg/m    Body mass index is 29.45 kg/m .  Physical Exam   GENERAL: healthy, alert and no distress  NECK: no adenopathy, no asymmetry, masses, or scars and thyroid normal to palpation  RESP: lungs clear to auscultation - no rales, rhonchi or wheezes  CV: regular rate and rhythm, normal S1 S2, no S3 or S4, no murmur, click or rub, no peripheral edema and peripheral pulses strong  ABDOMEN: soft, nontender, no hepatosplenomegaly, no masses and bowel sounds normal  Shoulder range of motion is somewhat limited due to pain.  Overhead activities are limited.

## 2023-08-09 ENCOUNTER — HOSPITAL ENCOUNTER (OUTPATIENT)
Dept: MRI IMAGING | Facility: CLINIC | Age: 43
Discharge: HOME | End: 2023-08-09
Attending: FAMILY MEDICINE | Admitting: FAMILY MEDICINE
Payer: COMMERCIAL

## 2023-08-09 DIAGNOSIS — M25.512 LEFT SHOULDER PAIN, UNSPECIFIED CHRONICITY: ICD-10-CM

## 2023-08-09 PROCEDURE — 73221 MRI JOINT UPR EXTREM W/O DYE: CPT | Mod: LT

## 2023-08-09 PROCEDURE — 73221 MRI JOINT UPR EXTREM W/O DYE: CPT | Mod: 26 | Performed by: RADIOLOGY

## (undated) DEVICE — CATH INTERMITTENT CLEAN-CATH FEMALE 14FR 6" VINYL LF 420614

## (undated) DEVICE — GLOVE PROTEXIS BLUE W/NEU-THERA 6.5  2D73EB65

## (undated) DEVICE — SUCTION CANNULA UTERINE 08MM STR 21655

## (undated) DEVICE — SOL NACL 0.9% IRRIG 1000ML BOTTLE 2F7124

## (undated) DEVICE — PACK TVT HYSTEROSCOPY SMA15HYFSE

## (undated) DEVICE — LINEN TOWEL PACK X5 5464

## (undated) DEVICE — TUBING VACUUM COLLECTION 6FT 23116

## (undated) DEVICE — GLOVE PROTEXIS POWDER FREE 6.0 ORTHOPEDIC 2D73ET60

## (undated) RX ORDER — LIDOCAINE HYDROCHLORIDE 20 MG/ML
INJECTION, SOLUTION EPIDURAL; INFILTRATION; INTRACAUDAL; PERINEURAL
Status: DISPENSED
Start: 2020-04-23

## (undated) RX ORDER — PROPOFOL 10 MG/ML
INJECTION, EMULSION INTRAVENOUS
Status: DISPENSED
Start: 2020-04-23

## (undated) RX ORDER — GLYCOPYRROLATE 0.2 MG/ML
INJECTION, SOLUTION INTRAMUSCULAR; INTRAVENOUS
Status: DISPENSED
Start: 2020-04-23

## (undated) RX ORDER — FENTANYL CITRATE 50 UG/ML
INJECTION, SOLUTION INTRAMUSCULAR; INTRAVENOUS
Status: DISPENSED
Start: 2020-04-23

## (undated) RX ORDER — ONDANSETRON 2 MG/ML
INJECTION INTRAMUSCULAR; INTRAVENOUS
Status: DISPENSED
Start: 2020-04-23

## (undated) RX ORDER — DEXAMETHASONE SODIUM PHOSPHATE 4 MG/ML
INJECTION, SOLUTION INTRA-ARTICULAR; INTRALESIONAL; INTRAMUSCULAR; INTRAVENOUS; SOFT TISSUE
Status: DISPENSED
Start: 2020-04-23